# Patient Record
Sex: FEMALE | Race: WHITE | Employment: FULL TIME | URBAN - NONMETROPOLITAN AREA
[De-identification: names, ages, dates, MRNs, and addresses within clinical notes are randomized per-mention and may not be internally consistent; named-entity substitution may affect disease eponyms.]

---

## 2020-12-22 ENCOUNTER — OFFICE VISIT (OUTPATIENT)
Dept: FAMILY MEDICINE CLINIC | Age: 35
End: 2020-12-22
Payer: COMMERCIAL

## 2020-12-22 VITALS
DIASTOLIC BLOOD PRESSURE: 80 MMHG | WEIGHT: 117 LBS | HEIGHT: 61 IN | HEART RATE: 80 BPM | SYSTOLIC BLOOD PRESSURE: 115 MMHG | BODY MASS INDEX: 22.09 KG/M2 | OXYGEN SATURATION: 98 %

## 2020-12-22 PROCEDURE — G8482 FLU IMMUNIZE ORDER/ADMIN: HCPCS | Performed by: NURSE PRACTITIONER

## 2020-12-22 PROCEDURE — 90686 IIV4 VACC NO PRSV 0.5 ML IM: CPT | Performed by: NURSE PRACTITIONER

## 2020-12-22 PROCEDURE — 99385 PREV VISIT NEW AGE 18-39: CPT | Performed by: NURSE PRACTITIONER

## 2020-12-22 PROCEDURE — 90471 IMMUNIZATION ADMIN: CPT | Performed by: NURSE PRACTITIONER

## 2020-12-22 RX ORDER — ALBUTEROL SULFATE 90 UG/1
2 AEROSOL, METERED RESPIRATORY (INHALATION) EVERY 6 HOURS PRN
COMMUNITY
End: 2020-12-22 | Stop reason: SDUPTHER

## 2020-12-22 RX ORDER — DULOXETIN HYDROCHLORIDE 30 MG/1
CAPSULE, DELAYED RELEASE ORAL
COMMUNITY
Start: 2020-12-09 | End: 2021-06-21

## 2020-12-22 RX ORDER — ALBUTEROL SULFATE 90 UG/1
2 AEROSOL, METERED RESPIRATORY (INHALATION) EVERY 6 HOURS PRN
Qty: 1 INHALER | Refills: 1 | Status: SHIPPED | OUTPATIENT
Start: 2020-12-22 | End: 2021-06-21

## 2020-12-22 RX ORDER — ALPRAZOLAM 0.25 MG/1
0.25 TABLET ORAL PRN
COMMUNITY
Start: 2020-12-14

## 2020-12-22 RX ORDER — DULOXETIN HYDROCHLORIDE 60 MG/1
CAPSULE, DELAYED RELEASE ORAL
COMMUNITY
Start: 2020-11-22 | End: 2020-12-22

## 2020-12-22 RX ORDER — TRAZODONE HYDROCHLORIDE 50 MG/1
50 TABLET ORAL NIGHTLY
COMMUNITY
End: 2021-12-23 | Stop reason: ALTCHOICE

## 2020-12-22 SDOH — ECONOMIC STABILITY: TRANSPORTATION INSECURITY
IN THE PAST 12 MONTHS, HAS THE LACK OF TRANSPORTATION KEPT YOU FROM MEDICAL APPOINTMENTS OR FROM GETTING MEDICATIONS?: NO

## 2020-12-22 SDOH — ECONOMIC STABILITY: TRANSPORTATION INSECURITY
IN THE PAST 12 MONTHS, HAS LACK OF TRANSPORTATION KEPT YOU FROM MEETINGS, WORK, OR FROM GETTING THINGS NEEDED FOR DAILY LIVING?: NO

## 2020-12-22 SDOH — ECONOMIC STABILITY: FOOD INSECURITY: WITHIN THE PAST 12 MONTHS, YOU WORRIED THAT YOUR FOOD WOULD RUN OUT BEFORE YOU GOT MONEY TO BUY MORE.: NEVER TRUE

## 2020-12-22 SDOH — HEALTH STABILITY: MENTAL HEALTH: HOW OFTEN DO YOU HAVE A DRINK CONTAINING ALCOHOL?: NOT ASKED

## 2020-12-22 SDOH — ECONOMIC STABILITY: INCOME INSECURITY: HOW HARD IS IT FOR YOU TO PAY FOR THE VERY BASICS LIKE FOOD, HOUSING, MEDICAL CARE, AND HEATING?: NOT HARD AT ALL

## 2020-12-22 SDOH — ECONOMIC STABILITY: FOOD INSECURITY: WITHIN THE PAST 12 MONTHS, THE FOOD YOU BOUGHT JUST DIDN'T LAST AND YOU DIDN'T HAVE MONEY TO GET MORE.: NEVER TRUE

## 2020-12-22 SDOH — HEALTH STABILITY: MENTAL HEALTH: HOW MANY STANDARD DRINKS CONTAINING ALCOHOL DO YOU HAVE ON A TYPICAL DAY?: NOT ASKED

## 2020-12-22 ASSESSMENT — ENCOUNTER SYMPTOMS
VOMITING: 0
SINUS PRESSURE: 0
CONSTIPATION: 0
NAUSEA: 0
WHEEZING: 0
COUGH: 0
CHEST TIGHTNESS: 0
SHORTNESS OF BREATH: 0
SORE THROAT: 0
SINUS PAIN: 0
DIARRHEA: 0
RHINORRHEA: 0
ABDOMINAL PAIN: 0

## 2020-12-22 ASSESSMENT — PATIENT HEALTH QUESTIONNAIRE - PHQ9
SUM OF ALL RESPONSES TO PHQ QUESTIONS 1-9: 0
SUM OF ALL RESPONSES TO PHQ9 QUESTIONS 1 & 2: 0
SUM OF ALL RESPONSES TO PHQ QUESTIONS 1-9: 0
1. LITTLE INTEREST OR PLEASURE IN DOING THINGS: 0
2. FEELING DOWN, DEPRESSED OR HOPELESS: 0
SUM OF ALL RESPONSES TO PHQ QUESTIONS 1-9: 0

## 2020-12-22 NOTE — PROGRESS NOTES
of hurting self or others. For stress relief, she listens to music and completes puzzles. Past Medical History:     Past Medical History:   Diagnosis Date    Arthritis     Asthma     Eczema       Reviewed all health maintenance requirements and ordered appropriate tests  Health Maintenance Due   Topic Date Due    HIV screen  12/28/1999    Cervical cancer screen  12/28/2005       Past Surgical History:     No past surgical history on file. Medications:       Prior to Admission medications    Medication Sig Start Date End Date Taking? Authorizing Provider   ALPRAZolam [employer]) 0.25 MG tablet  12/14/20  Yes Historical Provider, MD   DULoxetine (CYMBALTA) 30 MG extended release capsule TAKE 1 CAPSULE BY MOUTH ONCE DAILY (DO NOT CRUSH OR CHEW) 12/9/20  Yes Historical Provider, MD   sertraline (ZOLOFT) 50 MG tablet TAKE 1 TABLET BY MOUTH ONCE DAILY 12/9/20  Yes Historical Provider, MD   traZODone (DESYREL) 50 MG tablet Take 50 mg by mouth nightly   Yes Historical Provider, MD   albuterol sulfate HFA (VENTOLIN HFA) 108 (90 Base) MCG/ACT inhaler Inhale 2 puffs into the lungs every 6 hours as needed for Wheezing or Shortness of Breath 12/22/20  Yes MARGARETTE Thurman CNP        Allergies:       Patient has no known allergies. Social History:     Tobacco:    reports that she has never smoked. She has never used smokeless tobacco.  Alcohol:      reports current alcohol use. Drug Use:  reports no history of drug use. Family History:     No family history on file. Review of Systems:     Positive and Negative as described in HPI    Review of Systems   Constitutional: Positive for fatigue. Negative for chills, fever and unexpected weight change. HENT: Negative for congestion, rhinorrhea, sinus pressure, sinus pain and sore throat. Eyes: Negative for visual disturbance. Respiratory: Negative for cough, chest tightness, shortness of breath and wheezing.     Cardiovascular: Negative for chest pain There is no guarding. Hernia: No hernia is present. Lymphadenopathy:      Cervical: No cervical adenopathy. Skin:     General: Skin is warm. Comments: Scaling patches present right elbow and right shoulder   Neurological:      Mental Status: She is alert and oriented to person, place, and time. Psychiatric:         Mood and Affect: Mood normal.         Behavior: Behavior normal.         Thought Content: Thought content normal.         Judgment: Judgment normal.         Data:     No results found for: NA, K, CL, CO2, BUN, CREATININE, GLUCOSE, PROT, LABALBU, BILITOT, ALKPHOS, AST, ALT  Lab Results   Component Value Date    WBC 8.6 05/07/2012    RBC 3.71 05/07/2012    HGB 12.6 05/07/2012    HCT 35.8 05/07/2012    MCV 96.5 05/07/2012    MCH 33.9 05/07/2012    MCHC 35.2 05/07/2012    RDW 14.4 05/07/2012     05/07/2012    MPV NOT REPORTED 05/07/2012     No results found for: TSH  No results found for: CHOL, HDL, PSA, LABA1C    Assessment/Plan:      Diagnosis Orders   1. Wellness examination  ALT    AST    Basic Metabolic Panel    CBC    Hemoglobin A1C    Lipid Panel    TSH without Reflex   2. Needs flu shot  INFLUENZA, QUADV, 3 YRS AND OLDER, IM PF, PREFILL SYR OR SDV, 0.5ML (AFLURIA QUADV, PF)   3. Mild intermittent asthma without complication     4. Arthritis     5. Anxiety     6. Other depression     7. Eczema, unspecified type         -Reports last Pap greater than 1 year ago. Planning to follow-up with Dr. Mg Beverly (GYN). -Received flu vaccination in office today. -Supplied health counseling on well-balanced diet and routine physical exercise. -Recommended CeraVe emollient, lukewarm baths, and hydrocortisone cream for eczema.  -Will refill albuterol  -Recommended Voltaren gel for left ankle pain.  -Baseline labs ordered today.   --Call if symptoms worsen or persist. F/u 1 year for wellness    Completed Refills   Requested Prescriptions     Signed Prescriptions Disp Refills    albuterol sulfate HFA (VENTOLIN HFA) 108 (90 Base) MCG/ACT inhaler 1 Inhaler 1     Sig: Inhale 2 puffs into the lungs every 6 hours as needed for Wheezing or Shortness of Breath       Orders Placed This Encounter   Procedures    INFLUENZA, QUADV, 3 YRS AND OLDER, IM PF, PREFILL SYR OR SDV, 0.5ML (AFLURIA QUADV, PF)    ALT     Standing Status:   Future     Standing Expiration Date:   12/22/2021    AST     Standing Status:   Future     Standing Expiration Date:   12/22/2021    Basic Metabolic Panel     Standing Status:   Future     Standing Expiration Date:   12/22/2021    CBC     Standing Status:   Future     Standing Expiration Date:   12/22/2021    Hemoglobin A1C     Standing Status:   Future     Standing Expiration Date:   12/22/2021    Lipid Panel     Standing Status:   Future     Standing Expiration Date:   12/22/2021     Order Specific Question:   Is Patient Fasting?/# of Hours     Answer:   fasting, 8    TSH without Reflex     Standing Status:   Future     Standing Expiration Date:   12/22/2021        No results found for this visit on 12/22/20. Return in about 1 year (around 12/22/2021), or if symptoms worsen or fail to improve, for wellness. labs now.  .    Electronically signed by MARGARETTE Beatty CNP on 12/22/20 at 11:43 AM.

## 2020-12-30 ENCOUNTER — HOSPITAL ENCOUNTER (OUTPATIENT)
Age: 35
Discharge: HOME | End: 2020-12-30
Payer: COMMERCIAL

## 2020-12-30 LAB
ALT SERPL-CCNC: 26 U/L (ref 5–33)
ANION GAP SERPL CALCULATED.3IONS-SCNC: 9 MMOL/L (ref 9–17)
AST SERPL-CCNC: 21 U/L
BUN BLDV-MCNC: 14 MG/DL (ref 6–20)
BUN/CREAT BLD: 30 (ref 9–20)
CALCIUM SERPL-MCNC: 9.3 MG/DL (ref 8.6–10.4)
CHLORIDE BLD-SCNC: 101 MMOL/L (ref 98–107)
CHOLESTEROL/HDL RATIO: 2.5
CHOLESTEROL: 182 MG/DL
CO2: 26 MMOL/L (ref 20–31)
CREAT SERPL-MCNC: 0.46 MG/DL (ref 0.5–0.9)
ESTIMATED AVERAGE GLUCOSE: 80 MG/DL
GFR AFRICAN AMERICAN: >60 ML/MIN
GFR NON-AFRICAN AMERICAN: >60 ML/MIN
GFR SERPL CREATININE-BSD FRML MDRD: ABNORMAL ML/MIN/{1.73_M2}
GFR SERPL CREATININE-BSD FRML MDRD: ABNORMAL ML/MIN/{1.73_M2}
GLUCOSE BLD-MCNC: 91 MG/DL (ref 70–99)
HBA1C MFR BLD: 4.4 % (ref 4–6)
HCT VFR BLD CALC: 43.7 % (ref 36.3–47.1)
HDLC SERPL-MCNC: 73 MG/DL
HEMOGLOBIN: 14.3 G/DL (ref 11.9–15.1)
LDL CHOLESTEROL: 73 MG/DL (ref 0–130)
MCH RBC QN AUTO: 32.3 PG (ref 25.2–33.5)
MCHC RBC AUTO-ENTMCNC: 32.7 G/DL (ref 28.4–34.8)
MCV RBC AUTO: 98.6 FL (ref 82.6–102.9)
NRBC AUTOMATED: 0 PER 100 WBC
PDW BLD-RTO: 12.3 % (ref 11.8–14.4)
PLATELET # BLD: 286 K/UL (ref 138–453)
PMV BLD AUTO: 9.4 FL (ref 8.1–13.5)
POTASSIUM SERPL-SCNC: 3.7 MMOL/L (ref 3.7–5.3)
RBC # BLD: 4.43 M/UL (ref 3.95–5.11)
SODIUM BLD-SCNC: 136 MMOL/L (ref 135–144)
TRIGL SERPL-MCNC: 178 MG/DL
TSH SERPL DL<=0.05 MIU/L-ACNC: 0.57 MIU/L (ref 0.3–5)
VLDLC SERPL CALC-MCNC: ABNORMAL MG/DL (ref 1–30)
WBC # BLD: 5.8 K/UL (ref 3.5–11.3)

## 2020-12-30 PROCEDURE — 84460 ALANINE AMINO (ALT) (SGPT): CPT

## 2020-12-30 PROCEDURE — 84450 TRANSFERASE (AST) (SGOT): CPT

## 2020-12-30 PROCEDURE — 80048 BASIC METABOLIC PNL TOTAL CA: CPT

## 2020-12-30 PROCEDURE — 36415 COLL VENOUS BLD VENIPUNCTURE: CPT

## 2020-12-30 PROCEDURE — 84443 ASSAY THYROID STIM HORMONE: CPT

## 2020-12-30 PROCEDURE — 85027 COMPLETE CBC AUTOMATED: CPT

## 2020-12-30 PROCEDURE — 83036 HEMOGLOBIN GLYCOSYLATED A1C: CPT

## 2020-12-30 PROCEDURE — 80061 LIPID PANEL: CPT

## 2021-06-21 ENCOUNTER — HOSPITAL ENCOUNTER (OUTPATIENT)
Age: 36
Setting detail: SPECIMEN
Discharge: HOME | End: 2021-06-21
Payer: COMMERCIAL

## 2021-06-21 ENCOUNTER — OFFICE VISIT (OUTPATIENT)
Dept: OBGYN | Age: 36
End: 2021-06-21
Payer: COMMERCIAL

## 2021-06-21 VITALS
DIASTOLIC BLOOD PRESSURE: 68 MMHG | BODY MASS INDEX: 23.22 KG/M2 | WEIGHT: 123 LBS | SYSTOLIC BLOOD PRESSURE: 112 MMHG | HEIGHT: 61 IN

## 2021-06-21 DIAGNOSIS — Z01.419 WOMEN'S ANNUAL ROUTINE GYNECOLOGICAL EXAMINATION: Primary | ICD-10-CM

## 2021-06-21 DIAGNOSIS — R10.2 PELVIC PAIN: ICD-10-CM

## 2021-06-21 DIAGNOSIS — Z01.419 WOMEN'S ANNUAL ROUTINE GYNECOLOGICAL EXAMINATION: ICD-10-CM

## 2021-06-21 PROCEDURE — 99385 PREV VISIT NEW AGE 18-39: CPT | Performed by: OBSTETRICS & GYNECOLOGY

## 2021-06-21 PROCEDURE — G0145 SCR C/V CYTO,THINLAYER,RESCR: HCPCS

## 2021-06-21 RX ORDER — QUETIAPINE FUMARATE 50 MG/1
TABLET, FILM COATED ORAL NIGHTLY PRN
COMMUNITY
Start: 2021-04-22

## 2021-06-21 RX ORDER — SERTRALINE HYDROCHLORIDE 100 MG/1
TABLET, FILM COATED ORAL
COMMUNITY
Start: 2021-06-03

## 2021-06-21 ASSESSMENT — ENCOUNTER SYMPTOMS
CONSTIPATION: 0
SHORTNESS OF BREATH: 0
DIARRHEA: 0
ABDOMINAL PAIN: 0

## 2021-06-21 ASSESSMENT — PATIENT HEALTH QUESTIONNAIRE - PHQ9
SUM OF ALL RESPONSES TO PHQ9 QUESTIONS 1 & 2: 0
1. LITTLE INTEREST OR PLEASURE IN DOING THINGS: 0
SUM OF ALL RESPONSES TO PHQ QUESTIONS 1-9: 0
2. FEELING DOWN, DEPRESSED OR HOPELESS: 0

## 2021-06-21 NOTE — PROGRESS NOTES
SUBJECTIVE:      Patient ID: Kamilah Xiao is a 62 y.o. female.    Chief Complaint: Osteoarthritis (Requesting  referral Amadou )    Kamilah Xiao, a 62 y.o female, presents to the clinic as a new patient for a osteoarthritis referral.  She has an appointment with Dr. Tobar on 12/ at 1:15 and was told she needed a referral before her appointment.  Osteoarthirtis started in 2014 and became wose in 2016.  Sites include knees, hands, lower back, and hips.  The pain is worse in the lower back and hips.  She cannot stand for long periods of time or walk long distances.  She has to take multiple breaks throughout the day when doing house work.  Taking meloxicam 15 mg daily.  She currently denies pain.     Osteoarthritis  This is a chronic problem. The current episode started more than 1 year ago. The problem occurs 2 to 4 times per day. The problem has been waxing and waning. Associated symptoms include arthralgias and joint swelling (hands). Pertinent negatives include no abdominal pain, change in bowel habit, chest pain, chills, congestion, coughing, diaphoresis, fatigue, fever, headaches, nausea, neck pain, numbness, rash, sore throat, vomiting or weakness. The symptoms are aggravated by walking, standing and exertion. She has tried NSAIDs and heat for the symptoms. The treatment provided moderate relief.       No family history on file.   Social History     Socioeconomic History    Marital status:      Spouse name: Not on file    Number of children: Not on file    Years of education: Not on file    Highest education level: Not on file   Occupational History    Not on file   Social Needs    Financial resource strain: Not on file    Food insecurity     Worry: Not on file     Inability: Not on file    Transportation needs     Medical: Not on file     Non-medical: Not on file   Tobacco Use    Smoking status: Never Smoker    Smokeless tobacco: Never Used   Substance and Sexual Activity    Alcohol  YEARLY PHYSICAL    Date of service: 2021    Irvin Robertson  Is a 38 y.o.   female    PT's PCP is: MARGARETTE Roberts CNP     : 1985                                             Subjective:       Patient's last menstrual period was 06/10/2021 (exact date). Are your menses regular: not applicable    OB History    Para Term  AB Living   3 2 2   1 2   SAB TAB Ectopic Molar Multiple Live Births   1         2      # Outcome Date GA Lbr Mason/2nd Weight Sex Delivery Anes PTL Lv   3 Term 12    F Vag-Spont   ANTOLIN   2 Term 06/01/10    M Vag-Spont   ANTOLIN   1 SAB 2009     SAB   ND        Social History     Tobacco Use   Smoking Status Never Smoker   Smokeless Tobacco Never Used        Social History     Substance and Sexual Activity   Alcohol Use Yes       Family History   Problem Relation Age of Onset    Thyroid Disease Mother        Any family history of breast or ovarian cancer: Yes (great aunt)    Any family history of blood clots: Yes        Allergies: Patient has no known allergies. Current Outpatient Medications:     QUEtiapine (SEROQUEL) 50 MG tablet, TAKE 1 TO 2 TABLETS BY MOUTH AT BEDTIME, Disp: , Rfl:     sertraline (ZOLOFT) 100 MG tablet, TAKE 1 TABLET BY MOUTH ONCE DAILY, Disp: , Rfl:     ALPRAZolam (XANAX) 0.25 MG tablet, , Disp: , Rfl:     traZODone (DESYREL) 50 MG tablet, Take 50 mg by mouth nightly, Disp: , Rfl:     Social History     Substance and Sexual Activity   Sexual Activity Yes    Partners: Male    Birth control/protection: Surgical       Any bleeding or pain with intercourse: No    Last Yearly:  2018    Last pap: 2018?     Last HPV: unknown    Last Mammogram: never    Last Shearon Martin never    Last colorectal screen- type:colonoscopy*  date      Do you do self breast exams: No    Past Medical History:   Diagnosis Date    Arthritis     Asthma     Eczema        Past Surgical History:   Procedure Laterality Date    CHOLECYSTECTOMY      ENDOMETRIAL ABLATION      KNEE SURGERY Right     LEG SURGERY Left     15 surgical procedures.  TONSILLECTOMY         Family History   Problem Relation Age of Onset    Thyroid Disease Mother        Chief Complaint   Patient presents with    Gynecologic Exam     Patient is being seen today for yearly and pap. She notes that last few cycles she has had horrible cramping. PE: Vital Signs  Blood pressure 112/68, height 5' 1\" (1.549 m), weight 123 lb (55.8 kg), last menstrual period 06/10/2021, not currently breastfeeding. Estimated body mass index is 23.24 kg/m² as calculated from the following:    Height as of this encounter: 5' 1\" (1.549 m). Weight as of this encounter: 123 lb (55.8 kg). Labs:    No results found for this visit on 06/21/21. PHQ-9 Total Score: 0 (6/21/2021  6:33 PM)      NURSE: Farida WEEMS      HPI: here for her annual exam    Review of Systems   Constitutional: Negative for chills, fatigue and fever. Respiratory: Negative for shortness of breath. Cardiovascular: Negative for chest pain. Gastrointestinal: Negative for abdominal pain, constipation and diarrhea. Genitourinary: Positive for pelvic pain (has started having pelvic pain again - hadn't had anything for years after ablation). Negative for dysuria, enuresis, frequency, menstrual problem, urgency and vaginal bleeding. Neurological: Negative for dizziness, light-headedness and headaches. Objective  Physical Exam  Constitutional:       Appearance: Normal appearance. Genitourinary:      Pelvic exam was performed with patient in the lithotomy position. Vulva, vagina, cervix, uterus, right adnexa and left adnexa normal.   HENT:      Head: Atraumatic. Mouth/Throat:      Mouth: Mucous membranes are moist.   Eyes:      Extraocular Movements: Extraocular movements intact. Pupils: Pupils are equal, round, and reactive to light. use: Never     Frequency: Never    Drug use: Not on file    Sexual activity: Not on file   Lifestyle    Physical activity     Days per week: Not on file     Minutes per session: Not on file    Stress: Not on file   Relationships    Social connections     Talks on phone: Not on file     Gets together: Not on file     Attends Religion service: Not on file     Active member of club or organization: Not on file     Attends meetings of clubs or organizations: Not on file     Relationship status: Not on file   Other Topics Concern    Not on file   Social History Narrative    Not on file     Current Outpatient Medications   Medication Sig Dispense Refill    calcium carbonate-vitamin D3 (CALCIUM 500 + D, D3,) 500 mg(1,250mg) -125 unit per tablet Take 1 tablet by mouth once daily.      meloxicam (MOBIC) 15 MG tablet Take 15 mg by mouth once daily.      omeprazole (PRILOSEC OTC) 20 MG tablet Take 20 mg by mouth once daily.      spironolactone (ALDACTONE) 100 MG tablet Take 100 mg by mouth once daily.       No current facility-administered medications for this visit.      Review of patient's allergies indicates:  No Known Allergies   No past medical history on file.  Past Surgical History:   Procedure Laterality Date    CHOLECYSTECTOMY  1999       Review of Systems   Constitutional: Negative for activity change, appetite change, chills, diaphoresis, fatigue, fever and unexpected weight change.   HENT: Negative for congestion, ear pain, sinus pressure, sore throat, trouble swallowing and voice change.    Eyes: Negative for pain, discharge and visual disturbance.   Respiratory: Negative for cough, chest tightness, shortness of breath and wheezing.    Cardiovascular: Negative for chest pain and palpitations.   Gastrointestinal: Negative for abdominal pain, change in bowel habit, constipation, diarrhea, nausea and vomiting.   Genitourinary: Negative for difficulty urinating, flank pain, frequency and urgency.  "  Musculoskeletal: Positive for arthralgias, back pain and joint swelling (hands). Negative for neck pain.        Knee, marcel hip, and marcel hand pain.   Skin: Negative for color change and rash.   Neurological: Negative for dizziness, seizures, syncope, weakness, numbness and headaches.   Hematological: Negative for adenopathy.   Psychiatric/Behavioral: Negative for dysphoric mood and sleep disturbance. The patient is not nervous/anxious.       OBJECTIVE:      Vitals:    12/07/20 1456   BP: 124/82   BP Location: Left arm   Patient Position: Sitting   BP Method: Large (Automatic)   Pulse: 78   Temp: 98.5 °F (36.9 °C)   SpO2: 97%   Weight: 107.5 kg (237 lb)   Height: 5' 5.5" (1.664 m)     Physical Exam  Vitals signs and nursing note reviewed.   Constitutional:       General: She is awake. She is not in acute distress.     Appearance: Normal appearance. She is obese. She is not ill-appearing, toxic-appearing or diaphoretic.   HENT:      Head: Normocephalic and atraumatic.      Right Ear: Tympanic membrane, ear canal and external ear normal. There is no impacted cerumen.      Left Ear: Tympanic membrane, ear canal and external ear normal. There is no impacted cerumen.      Mouth/Throat:      Mouth: Mucous membranes are moist.      Pharynx: No oropharyngeal exudate or posterior oropharyngeal erythema.   Eyes:      General: Lids are normal. No scleral icterus.     Conjunctiva/sclera: Conjunctivae normal.      Pupils: Pupils are equal, round, and reactive to light.   Neck:      Musculoskeletal: Neck supple.      Thyroid: No thyromegaly.   Cardiovascular:      Rate and Rhythm: Normal rate and regular rhythm.      Pulses: Normal pulses.      Heart sounds: Normal heart sounds. No murmur. No friction rub. No gallop.    Pulmonary:      Effort: Pulmonary effort is normal. No respiratory distress.      Breath sounds: Normal breath sounds. No stridor. No wheezing, rhonchi or rales.   Musculoskeletal: Normal range of motion.      " Cardiovascular:      Rate and Rhythm: Normal rate. Pulmonary:      Effort: Pulmonary effort is normal.   Chest:      Breasts:         Right: Normal.         Left: Normal.   Abdominal:      General: There is no distension. Palpations: Abdomen is soft. Tenderness: There is no abdominal tenderness. Musculoskeletal:         General: Normal range of motion. Cervical back: Normal range of motion. Neurological:      Mental Status: She is alert and oriented to person, place, and time. Skin:     General: Skin is warm and dry. Psychiatric:         Mood and Affect: Mood normal.         Behavior: Behavior normal.         Thought Content: Thought content normal.         Judgment: Judgment normal.   Chaperone present: chaperone declined. Assessment and Plan          Diagnosis Orders   1. Women's annual routine gynecological examination  PAP SMEAR   2. Pelvic pain               I am having Brittnee Mocha. Shirin maintain her ALPRAZolam, traZODone, QUEtiapine, and sertraline. Return in about 2 weeks (around 7/5/2021) for usn, follow up. She was also counseled on her preventative health maintenance recommendations and follow-up. There are no Patient Instructions on file for this visit.     Kiana Sanchez DO,6/21/2021 6:54 PM Lumbar back: She exhibits tenderness.      Right lower le+ Edema present.      Left lower le+ Edema present.   Lymphadenopathy:      Cervical: No cervical adenopathy.   Skin:     General: Skin is warm and dry.      Capillary Refill: Capillary refill takes less than 2 seconds.      Findings: No erythema or rash.   Neurological:      Mental Status: She is alert and oriented to person, place, and time. Mental status is at baseline.   Psychiatric:         Mood and Affect: Mood normal.         Behavior: Behavior normal. Behavior is cooperative.         Thought Content: Thought content normal.         Judgment: Judgment normal.        Assessment:       1. Osteoarthritis of multiple joints, unspecified osteoarthritis type        Plan:       Osteoarthritis of multiple joints, unspecified osteoarthritis type   Stable, continue Meloxicam.  Referral provided at patient request for upcoming appointment.   -     Ambulatory referral/consult to Rheumatology; Future; Expected date: 2020    This note was created using [employer] voice recognition software that occasionally misinterprets phrases or words.    Follow up if symptoms worsen or fail to improve.      2020 PAPITO Harris, FNP

## 2021-06-28 LAB — CYTOLOGY REPORT: NORMAL

## 2021-06-30 DIAGNOSIS — N76.0 BV (BACTERIAL VAGINOSIS): Primary | ICD-10-CM

## 2021-06-30 DIAGNOSIS — B96.89 BV (BACTERIAL VAGINOSIS): Primary | ICD-10-CM

## 2021-06-30 RX ORDER — METRONIDAZOLE 500 MG/1
500 TABLET ORAL 2 TIMES DAILY
Qty: 14 TABLET | Refills: 0 | Status: SHIPPED | OUTPATIENT
Start: 2021-06-30 | End: 2021-07-07

## 2021-07-20 ENCOUNTER — OFFICE VISIT (OUTPATIENT)
Dept: OBGYN | Age: 36
End: 2021-07-20
Payer: COMMERCIAL

## 2021-07-20 VITALS
HEIGHT: 61 IN | WEIGHT: 122 LBS | BODY MASS INDEX: 23.03 KG/M2 | DIASTOLIC BLOOD PRESSURE: 68 MMHG | SYSTOLIC BLOOD PRESSURE: 106 MMHG

## 2021-07-20 DIAGNOSIS — N80.03 ADENOMYOSIS: ICD-10-CM

## 2021-07-20 DIAGNOSIS — R10.2 PELVIC PAIN IN FEMALE: Primary | ICD-10-CM

## 2021-07-20 PROCEDURE — G8427 DOCREV CUR MEDS BY ELIG CLIN: HCPCS | Performed by: OBSTETRICS & GYNECOLOGY

## 2021-07-20 PROCEDURE — G8420 CALC BMI NORM PARAMETERS: HCPCS | Performed by: OBSTETRICS & GYNECOLOGY

## 2021-07-20 PROCEDURE — 99213 OFFICE O/P EST LOW 20 MIN: CPT | Performed by: OBSTETRICS & GYNECOLOGY

## 2021-07-20 PROCEDURE — 1036F TOBACCO NON-USER: CPT | Performed by: OBSTETRICS & GYNECOLOGY

## 2021-07-20 RX ORDER — NAPROXEN 500 MG/1
500 TABLET ORAL 2 TIMES DAILY PRN
Qty: 30 TABLET | Refills: 5 | Status: SHIPPED | OUTPATIENT
Start: 2021-07-20 | End: 2022-07-26

## 2021-07-20 RX ORDER — TOPIRAMATE 25 MG/1
TABLET ORAL
COMMUNITY
Start: 2021-06-28

## 2021-07-20 NOTE — PROGRESS NOTES
DATE OF VISIT:  7/20/21    PATIENT NAME:  Avelino Carpio     YOB: 1985    REASON FOR VISIT:    Chief Complaint   Patient presents with    Follow-up     Patient is being for follow up after GYN US. She had this done due to severe crmaping during periods. HISTORY OF PRESENT ILLNESS:  Pt continues to have pelvic pain/cramping the day before and the 3 days of her cycle; cycles arent heavy since her ablation but now painful; disc'd nsaid, POP, v considering tlh if impact on quality of life warrants; pt wants to just start with nsaid at this point      Patient's last menstrual period was 07/09/2021. Vitals:    07/20/21 1449   BP: 106/68   Weight: 122 lb (55.3 kg)   Height: 5' 1\" (1.549 m)     Body mass index is 23.05 kg/m². No Known Allergies  Current Outpatient Medications   Medication Sig Dispense Refill    topiramate (TOPAMAX) 25 MG tablet TAKE 1 TABLET BY MOUTH TWICE DAILY      naproxen (NAPROSYN) 500 MG tablet Take 1 tablet by mouth 2 times daily as needed for Pain 30 tablet 5    QUEtiapine (SEROQUEL) 50 MG tablet TAKE 1 TO 2 TABLETS BY MOUTH AT BEDTIME      sertraline (ZOLOFT) 100 MG tablet TAKE 1 TABLET BY MOUTH ONCE DAILY      ALPRAZolam (XANAX) 0.25 MG tablet       traZODone (DESYREL) 50 MG tablet Take 50 mg by mouth nightly       No current facility-administered medications for this visit. Social History     Socioeconomic History    Marital status:      Spouse name: None    Number of children: None    Years of education: None    Highest education level: None   Occupational History    None   Tobacco Use    Smoking status: Never Smoker    Smokeless tobacco: Never Used   Substance and Sexual Activity    Alcohol use:  Yes    Drug use: Never    Sexual activity: Yes     Partners: Male     Birth control/protection: Surgical   Other Topics Concern    None   Social History Narrative    None     Social Determinants of Health     Financial Resource Strain: Low Risk  Difficulty of Paying Living Expenses: Not hard at all   Food Insecurity: No Food Insecurity    Worried About Running Out of Food in the Last Year: Never true    Ran Out of Food in the Last Year: Never true   Transportation Needs: No Transportation Needs    Lack of Transportation (Medical): No    Lack of Transportation (Non-Medical): No   Physical Activity:     Days of Exercise per Week:     Minutes of Exercise per Session:    Stress:     Feeling of Stress :    Social Connections:     Frequency of Communication with Friends and Family:     Frequency of Social Gatherings with Friends and Family:     Attends Jehovah's witness Services:     Active Member of Clubs or Organizations:     Attends Club or Organization Meetings:     Marital Status:    Intimate Partner Violence:     Fear of Current or Ex-Partner:     Emotionally Abused:     Physically Abused:     Sexually Abused:        REVIEW OF SYSTEMS:  Review of Systems   Constitutional: Negative for chills and fever. Genitourinary: Positive for pelvic pain. Negative for dysuria, menstrual problem and vaginal discharge. PHYSICAL EXAM:  /68   Ht 5' 1\" (1.549 m)   Wt 122 lb (55.3 kg)   LMP 07/09/2021   BMI 23.05 kg/m²   Physical Exam  Constitutional:       Appearance: Normal appearance. HENT:      Head: Normocephalic and atraumatic. Eyes:      Extraocular Movements: Extraocular movements intact. Pupils: Pupils are equal, round, and reactive to light. Cardiovascular:      Rate and Rhythm: Normal rate. Pulmonary:      Effort: Pulmonary effort is normal.   Musculoskeletal:         General: Normal range of motion. Cervical back: Normal range of motion. Neurological:      Mental Status: She is alert and oriented to person, place, and time. Skin:     General: Skin is warm and dry. Psychiatric:         Mood and Affect: Mood normal.         Behavior: Behavior normal.         Thought Content:  Thought content normal. Judgment: Judgment normal.       The patient, Blanka Espinal is a 38 y.o. female, was seen with a total time spent of 20 minutes for the visit on this date of service by the E/M provider. The time component had both face to face and non face to face time spent in determining the total time component. Counseling and education regarding her diagnosis listed below and her options regarding those diagnoses were also included in determining her time component. Diagnosis Orders   1. Pelvic pain in female     2. Adenomyosis          The patient had her preventative health maintenance recommendations and follow-up reviewed with her at the completion of her visit. ASSESSMENT:      1. Pelvic pain in female    2. Adenomyosis        PLAN:  No orders of the defined types were placed in this encounter.     Return for annual.       Electronically signed by Reanna Junior DO on 07/20/21

## 2021-08-03 ENCOUNTER — OFFICE VISIT (OUTPATIENT)
Dept: FAMILY MEDICINE CLINIC | Age: 36
End: 2021-08-03
Payer: COMMERCIAL

## 2021-08-03 VITALS
SYSTOLIC BLOOD PRESSURE: 110 MMHG | HEIGHT: 61 IN | WEIGHT: 122 LBS | BODY MASS INDEX: 23.03 KG/M2 | DIASTOLIC BLOOD PRESSURE: 62 MMHG

## 2021-08-03 DIAGNOSIS — L24.7 IRRITANT CONTACT DERMATITIS DUE TO PLANTS, EXCEPT FOOD: Primary | ICD-10-CM

## 2021-08-03 PROCEDURE — 1036F TOBACCO NON-USER: CPT | Performed by: NURSE PRACTITIONER

## 2021-08-03 PROCEDURE — G8427 DOCREV CUR MEDS BY ELIG CLIN: HCPCS | Performed by: NURSE PRACTITIONER

## 2021-08-03 PROCEDURE — 99213 OFFICE O/P EST LOW 20 MIN: CPT | Performed by: NURSE PRACTITIONER

## 2021-08-03 PROCEDURE — G8420 CALC BMI NORM PARAMETERS: HCPCS | Performed by: NURSE PRACTITIONER

## 2021-08-03 RX ORDER — PREDNISONE 20 MG/1
TABLET ORAL
Qty: 11 TABLET | Refills: 0 | Status: SHIPPED | OUTPATIENT
Start: 2021-08-03 | End: 2021-08-10 | Stop reason: ALTCHOICE

## 2021-08-03 RX ORDER — TRIAMCINOLONE ACETONIDE 1 MG/G
CREAM TOPICAL
Qty: 1 TUBE | Refills: 1 | Status: SHIPPED | OUTPATIENT
Start: 2021-08-03 | End: 2021-08-10 | Stop reason: SDUPTHER

## 2021-08-03 NOTE — PROGRESS NOTES
Name: Taisha Llamas  : 1985         Chief Complaint:     Chief Complaint   Patient presents with    Rash     Pt presents today for a 5-6 day hx of bug bites/rash. States this continues to spread and has areas on her R antecubital space, R calf and heel and L second toe. Now she has a number of smaller areas going up in her legs and into her thighs. She has tried hydrocortisone, Benadryl, Bactine, oatmeal bath, calamine and baby shampoo. Nothing has offered any relief. History of Present Illness: Taisha Llamas is a 38 y.o.  female who presents with Rash (Pt presents today for a 5-6 day hx of bug bites/rash. States this continues to spread and has areas on her R antecubital space, R calf and heel and L second toe. Now she has a number of smaller areas going up in her legs and into her thighs. She has tried hydrocortisone, Benadryl, Bactine, oatmeal bath, calamine and baby shampoo. Nothing has offered any relief. )      HPI  The patient presents with rash. Rash began 5 days ago and started right arm and right leg and has since spread to left toe, right calf, right chest, and right arm. Rash is continuing to spread. She has used hydrocortisone, oatmeal baths, baby shampoo, and calamine lotion without relief. She went hiking 6 days ago and was around a lot of plants at this time. She has tried to keep it clean and has cleaned her sheets. Rash is pruritic. Rash is not painful. Denies fever or chills. Denies new lotions, soaps, detergents, or foods. Denies house contacts with similar symptoms.      Past Medical History:     Past Medical History:   Diagnosis Date    Arthritis     Asthma     Eczema       Reviewed all health maintenance requirements and ordered appropriate tests  Health Maintenance Due   Topic Date Due    Hepatitis C screen  Never done    HIV screen  Never done       Past Surgical History:     Past Surgical History:   Procedure Laterality Date    CHOLECYSTECTOMY      ENDOMETRIAL ABLATION      KNEE SURGERY Right     LEG SURGERY Left     15 surgical procedures.  TONSILLECTOMY          Medications:       Prior to Admission medications    Medication Sig Start Date End Date Taking? Authorizing Provider   predniSONE (DELTASONE) 20 MG tablet Take 2 tablets by mouth once daily x3 days. Then take 1 tablet by mouth once daily x3 days. Then take 1/2 tablet by mouth once daily x3 days. 8/3/21  Yes MARGARETTE Vivar CNP   triamcinolone (KENALOG) 0.1 % cream Apply topically 2 times daily. Do not apply to face or genital area. 8/3/21  Yes MARGARETTE Vivar CNP   topiramate (TOPAMAX) 25 MG tablet TAKE 1 TABLET BY MOUTH TWICE DAILY 6/28/21  Yes Historical Provider, MD   naproxen (NAPROSYN) 500 MG tablet Take 1 tablet by mouth 2 times daily as needed for Pain 7/20/21  Yes Von Most, DO   QUEtiapine (SEROQUEL) 50 MG tablet TAKE 1 TO 2 TABLETS BY MOUTH AT BEDTIME 4/22/21  Yes Historical Provider, MD   sertraline (ZOLOFT) 100 MG tablet TAKE 1 TABLET BY MOUTH ONCE DAILY 6/3/21  Yes Historical Provider, MD   ALPRAZolam Joycelyn Pile) 0.25 MG tablet  12/14/20  Yes Historical Provider, MD   traZODone (DESYREL) 50 MG tablet Take 50 mg by mouth nightly   Yes Historical Provider, MD        Allergies:       Patient has no known allergies. Social History:     Tobacco:    reports that she has never smoked. She has never used smokeless tobacco.  Alcohol:      reports current alcohol use. Drug Use:  reports no history of drug use. Family History:     Family History   Problem Relation Age of Onset    Thyroid Disease Mother        Review of Systems:     Positive and Negative as described in HPI    Review of Systems   Constitutional: Negative for chills and fever. Skin: Positive for rash.        Physical Exam:   Vitals:  /62 (Site: Left Upper Arm, Position: Sitting, Cuff Size: Medium Adult)   Ht 5' 1\" (1.549 m)   Wt 122 lb (55.3 kg)   LMP 07/09/2021   BMI 23.05 kg/m²     Physical Exam  Constitutional:       General: She is not in acute distress. Appearance: Normal appearance. She is normal weight. She is not ill-appearing or toxic-appearing. HENT:      Head: Normocephalic. Cardiovascular:      Rate and Rhythm: Normal rate and regular rhythm. Heart sounds: Normal heart sounds. No murmur heard. Pulmonary:      Effort: Pulmonary effort is normal. No respiratory distress. Breath sounds: Normal breath sounds. No stridor. No wheezing, rhonchi or rales. Skin:     General: Skin is warm. Comments: Erythematous, slightly raised patch present on AC area right upper extremity and medial right leg. Erythematous papules with mild orange crusting present in linear fashion on upper and lower extremities bilaterally, right chest, right heel, and left second toe. Neurological:      Mental Status: She is alert and oriented to person, place, and time. Psychiatric:         Mood and Affect: Mood normal.         Behavior: Behavior normal.         Thought Content: Thought content normal.         Judgment: Judgment normal.         Data:     Lab Results   Component Value Date     12/30/2020    K 3.7 12/30/2020     12/30/2020    CO2 26 12/30/2020    BUN 14 12/30/2020    CREATININE 0.46 12/30/2020    GLUCOSE 91 12/30/2020    AST 21 12/30/2020    ALT 26 12/30/2020     Lab Results   Component Value Date    WBC 5.8 12/30/2020    RBC 4.43 12/30/2020    RBC 3.71 05/07/2012    HGB 14.3 12/30/2020    HCT 43.7 12/30/2020    MCV 98.6 12/30/2020    MCH 32.3 12/30/2020    MCHC 32.7 12/30/2020    RDW 12.3 12/30/2020     12/30/2020     05/07/2012    MPV 9.4 12/30/2020     Lab Results   Component Value Date    TSH 0.57 12/30/2020     Lab Results   Component Value Date    CHOL 182 12/30/2020    HDL 73 12/30/2020    LABA1C 4.4 12/30/2020       Assessment/Plan:      Diagnosis Orders   1.  Irritant contact dermatitis due to plants, except food         -Rx tapering oral prednisone. Reviewed side effects including insomnia. -Rx triamcinolone cream.  Discussed not to use on face or genital area. -Patient may continue calamine lotion, oral antihistamine, and oatmeal baths for symptom relief.  -Discussed possibility of rebound effect following discontinuation of steroids.  -Avoid itching.  -Notify office if symptoms worsen or persist.    Completed Refills   Requested Prescriptions     Signed Prescriptions Disp Refills    predniSONE (DELTASONE) 20 MG tablet 11 tablet 0     Sig: Take 2 tablets by mouth once daily x3 days. Then take 1 tablet by mouth once daily x3 days. Then take 1/2 tablet by mouth once daily x3 days.  triamcinolone (KENALOG) 0.1 % cream 1 Tube 1     Sig: Apply topically 2 times daily. Do not apply to face or genital area. No orders of the defined types were placed in this encounter. No results found for this visit on 08/03/21. Return if symptoms worsen or fail to improve.     Electronically signed by MARGARETTE Giordano CNP on 08/03/21 at 10:39 AM.

## 2021-08-03 NOTE — PATIENT INSTRUCTIONS
SURVEY:    You may be receiving a survey from [employer] regarding your visit today. Please complete the survey to enable us to provide the highest quality of care to you and your family. If you cannot score us a very good on any question, please call the office to discuss how we could have made your experience a very good one. Thank you.

## 2021-08-10 ENCOUNTER — TELEPHONE (OUTPATIENT)
Dept: OBGYN CLINIC | Age: 36
End: 2021-08-10

## 2021-08-10 ENCOUNTER — OFFICE VISIT (OUTPATIENT)
Dept: FAMILY MEDICINE CLINIC | Age: 36
End: 2021-08-10
Payer: COMMERCIAL

## 2021-08-10 ENCOUNTER — NURSE TRIAGE (OUTPATIENT)
Dept: OTHER | Facility: CLINIC | Age: 36
End: 2021-08-10

## 2021-08-10 VITALS
BODY MASS INDEX: 23.6 KG/M2 | DIASTOLIC BLOOD PRESSURE: 70 MMHG | OXYGEN SATURATION: 98 % | WEIGHT: 125 LBS | SYSTOLIC BLOOD PRESSURE: 112 MMHG | TEMPERATURE: 98.2 F | HEART RATE: 89 BPM | HEIGHT: 61 IN

## 2021-08-10 DIAGNOSIS — L24.7 IRRITANT CONTACT DERMATITIS DUE TO PLANTS, EXCEPT FOOD: Primary | ICD-10-CM

## 2021-08-10 PROCEDURE — G8427 DOCREV CUR MEDS BY ELIG CLIN: HCPCS | Performed by: NURSE PRACTITIONER

## 2021-08-10 PROCEDURE — 99213 OFFICE O/P EST LOW 20 MIN: CPT | Performed by: NURSE PRACTITIONER

## 2021-08-10 PROCEDURE — 1036F TOBACCO NON-USER: CPT | Performed by: NURSE PRACTITIONER

## 2021-08-10 PROCEDURE — G8420 CALC BMI NORM PARAMETERS: HCPCS | Performed by: NURSE PRACTITIONER

## 2021-08-10 RX ORDER — TRIAMCINOLONE ACETONIDE 1 MG/G
CREAM TOPICAL
Qty: 2 TUBE | Refills: 1 | Status: SHIPPED | OUTPATIENT
Start: 2021-08-10 | End: 2021-12-23 | Stop reason: ALTCHOICE

## 2021-08-10 RX ORDER — HYDROXYZINE HYDROCHLORIDE 25 MG/1
25 TABLET, FILM COATED ORAL EVERY 8 HOURS PRN
Qty: 15 TABLET | Refills: 0 | Status: SHIPPED | OUTPATIENT
Start: 2021-08-10 | End: 2021-08-20

## 2021-08-10 RX ORDER — PREDNISONE 10 MG/1
TABLET ORAL
Qty: 40 TABLET | Refills: 0 | Status: SHIPPED | OUTPATIENT
Start: 2021-08-10 | End: 2021-12-23 | Stop reason: ALTCHOICE

## 2021-08-10 ASSESSMENT — ENCOUNTER SYMPTOMS: SHORTNESS OF BREATH: 0

## 2021-08-10 NOTE — TELEPHONE ENCOUNTER
Received call from [facility] with [employer]. Brief description of triage: Woke today with right eye being swollen/puffy. She seen PCP last week for dermatitis and was given prednisone and a cream. Was better until today. No pain but feels weird. No fever, no itching. Triage indicates for patient to see today in office. Care advice provided, patient verbalizes understanding; denies any other questions or concerns; instructed to call back for any new or worsening symptoms. Writer provided warm transfer to [facility] for appointment scheduling. Attention Provider: Thank you for allowing me to participate in the care of your patient. The patient was connected to triage in response to information provided to the [facility]. Please do not respond through this encounter as the response is not directed to a shared pool. Reason for Disposition   MODERATE-SEVERE eyelid swelling on one side  (Exception: due to a mosquito bite)    Answer Assessment - Initial Assessment Questions  1. ONSET: \"When did the swelling start? \" (e.g., minutes, hours, days)      Woke up today with rash and right eye puffy  Took Benadryl and Claritin    2. LOCATION: \"What part of the eyelids is swollen? \"  Right eye is puffy under the eye    3. SEVERITY: \"How swollen is it? \"    Is puffy-not swollen shut    4. ITCHING: \"Is there any itching? \" If so, ask: \"How much? \"   (Scale 1-10; mild, moderate or severe)    No itching    5. PAIN: \"Is the swelling painful to touch? \" If so, ask: \"How painful is it? \"   (Scale 1-10; mild, moderate or severe)      No pain but feels weird    6. FEVER: \"Do you have a fever? \" If so, ask: \"What is it, how was it measured, and when did it start? \"       No fever    7. CAUSE: \"What do you think is causing the swelling? \"     Saw PCP last week, told she had dermatitis, given prednisone and a cream, things improved until now    8.  RECURRENT SYMPTOM: \"Have you had eyelid swelling before? \" If so, ask: \"When was the last time? \" \"What happened that time?\"    9. OTHER SYMPTOMS: \"Do you have any other symptoms? \" (e.g., blurred vision, eye discharge, rash, runny nose)   arms with rash  No breathing difficulties, no swollen tongue    10. PREGNANCY: \"Is there any chance you are pregnant? \" \"When was your last menstrual period? \"      no    Protocols used: [facility]

## 2021-08-10 NOTE — PROGRESS NOTES
Name: Stephanie Henning  : 1985         Chief Complaint:     Chief Complaint   Patient presents with    Rash     patient states her rash is getting worse. woke up this morning with right puffy eye. states the rash is very itchy. she was improving, but woke up this morning way worse. History of Present Illness: Stephanie Henning is a 38 y.o.  female who presents with Rash (patient states her rash is getting worse. woke up this morning with right puffy eye. states the rash is very itchy. she was improving, but woke up this morning way worse. )      HPI  The patient presents for rash f/u. She was seen in office 8/3/2021 and diagnosed with irritant contact dermatitis. This time, she was prescribed tapering oral prednisone and given Rx for topical triamcinolone cream.  Today, she states that her symptoms were improving until this morning. She has 1 day left of prednisone. This morning, she noticed worsening lesions on arms bilaterally, left leg, and right infra-orbital area. Admits swelling under her right eye this morning. Admits pruritis. She ran out of triamciminolone cream 2 days ago. She has been taking benadryl and Claritin. She is heading out of state to [place] tomorrow. Denies SOB, lip/tongue tingling, or throat swelling. Past Medical History:     Past Medical History:   Diagnosis Date    Arthritis     Asthma     Eczema       Reviewed all health maintenance requirements and ordered appropriate tests  Health Maintenance Due   Topic Date Due    Hepatitis C screen  Never done    HIV screen  Never done       Past Surgical History:     Past Surgical History:   Procedure Laterality Date    CHOLECYSTECTOMY      ENDOMETRIAL ABLATION      KNEE SURGERY Right     LEG SURGERY Left     15 surgical procedures.  TONSILLECTOMY          Medications:       Prior to Admission medications    Medication Sig Start Date End Date Taking?  Authorizing Provider   hydrOXYzine (ATARAX) 25 MG tablet Take 1 tablet by mouth every 8 hours as needed for Itching . CAUTION: this medication may cause sedation 8/10/21 8/20/21 Yes MARGARETTE Hudson CNP   triamcinolone (KENALOG) 0.1 % cream Apply topically 2 times daily. Do not apply to face or genital area. 8/10/21  Yes MARGARETTE Hudson CNP   predniSONE (DELTASONE) 10 MG tablet Take 4 tablets by mouth once daily for 4 days. Then take 3 tablets by mouth once daily for 4 days. Then take 2 tablets by mouth once daily for 4 days. Then take 1 tablet by mouth once daily for 4 days. 8/10/21  Yes MARGARETTE Hudson CNP   topiramate (TOPAMAX) 25 MG tablet TAKE 1 TABLET BY MOUTH TWICE DAILY 6/28/21  Yes Historical Provider, MD   naproxen (NAPROSYN) 500 MG tablet Take 1 tablet by mouth 2 times daily as needed for Pain 7/20/21  Yes Abelino Fuentes,    QUEtiapine (SEROQUEL) 50 MG tablet TAKE 1 TO 2 TABLETS BY MOUTH AT BEDTIME 4/22/21  Yes Historical Provider, MD   sertraline (ZOLOFT) 100 MG tablet TAKE 1 TABLET BY MOUTH ONCE DAILY 6/3/21  Yes Historical Provider, MD   ALPRAZolam Bianka Garcia) 0.25 MG tablet  12/14/20  Yes Historical Provider, MD   traZODone (DESYREL) 50 MG tablet Take 50 mg by mouth nightly   Yes Historical Provider, MD        Allergies:       Patient has no known allergies. Social History:     Tobacco:    reports that she has never smoked. She has never used smokeless tobacco.  Alcohol:      reports current alcohol use. Drug Use:  reports no history of drug use. Family History:     Family History   Problem Relation Age of Onset    Thyroid Disease Mother        Review of Systems:     Positive and Negative as described in HPI    Review of Systems   Respiratory: Negative for shortness of breath. Skin: Positive for rash.        Physical Exam:   Vitals:  /70   Pulse 89   Temp 98.2 °F (36.8 °C)   Ht 5' 1\" (1.549 m)   Wt 125 lb (56.7 kg)   SpO2 98%   BMI 23.62 kg/m²     Physical Exam  Constitutional:       General: She is not in acute distress. Appearance: Normal appearance. She is normal weight. She is not ill-appearing or toxic-appearing. HENT:      Head: Normocephalic. Cardiovascular:      Rate and Rhythm: Normal rate and regular rhythm. Heart sounds: Normal heart sounds. No murmur heard. Pulmonary:      Effort: Pulmonary effort is normal. No respiratory distress. Breath sounds: Normal breath sounds. No stridor. No wheezing, rhonchi or rales. Skin:     General: Skin is warm. Comments: Scattered erythematous papules in confluent and linear pattern on forearms bilaterally and left posterior lower extremity. Macular erythema and swelling located inferior to right orbit. Neurological:      Mental Status: She is alert and oriented to person, place, and time. Psychiatric:         Mood and Affect: Mood normal.         Behavior: Behavior normal.         Thought Content: Thought content normal.         Judgment: Judgment normal.         Data:     Lab Results   Component Value Date     12/30/2020    K 3.7 12/30/2020     12/30/2020    CO2 26 12/30/2020    BUN 14 12/30/2020    CREATININE 0.46 12/30/2020    GLUCOSE 91 12/30/2020    AST 21 12/30/2020    ALT 26 12/30/2020     Lab Results   Component Value Date    WBC 5.8 12/30/2020    RBC 4.43 12/30/2020    RBC 3.71 05/07/2012    HGB 14.3 12/30/2020    HCT 43.7 12/30/2020    MCV 98.6 12/30/2020    MCH 32.3 12/30/2020    MCHC 32.7 12/30/2020    RDW 12.3 12/30/2020     12/30/2020     05/07/2012    MPV 9.4 12/30/2020     Lab Results   Component Value Date    TSH 0.57 12/30/2020     Lab Results   Component Value Date    CHOL 182 12/30/2020    HDL 73 12/30/2020    LABA1C 4.4 12/30/2020       Assessment/Plan:      Diagnosis Orders   1. Irritant contact dermatitis due to plants, except food       -Discussed symptoms likely related to rebound effect with tapering oral prednisone.   Will refill oral prednisone with a longer taper.  -Refill triamcinolone

## 2021-08-10 NOTE — PATIENT INSTRUCTIONS
SURVEY:    You may be receiving a survey from [employer] regarding your visit today. Please complete the survey to enable us to provide the highest quality of care to you and your family. If you cannot score us a very good on any question, please call the office to discuss how we could of made your experience a very good one. Thank you.

## 2021-09-23 NOTE — TELEPHONE ENCOUNTER
I spoke to patient regarding surgery expectations and recovery. She is scheduled for a RA TLH, poss BSO, poss Lap Colpo at [facility] on 2/7/22. She is aware that a nurse from [facility] will call her to complete a phone interview. She has already received the COVID vaccine and aware that she currently does not need to have any pre-op COVID testing. 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 Patient will come in to see Dr. Isabel Varma prior to surgery. We will also follow up 4 weeks after surgery. Appointments scheduled. Patient verbalized understanding, no further questions/concerns voiced.

## 2021-10-07 ENCOUNTER — TELEPHONE (OUTPATIENT)
Dept: OBGYN CLINIC | Age: 36
End: 2021-10-07

## 2021-10-07 NOTE — TELEPHONE ENCOUNTER
Spoke to Nicholas at [facility] regarding patient's upcoming procedure ([id number]). The procedure does not require a PA. Reference # is [id number].

## 2021-12-23 ENCOUNTER — OFFICE VISIT (OUTPATIENT)
Dept: FAMILY MEDICINE CLINIC | Age: 36
End: 2021-12-23
Payer: COMMERCIAL

## 2021-12-23 ENCOUNTER — HOSPITAL ENCOUNTER (OUTPATIENT)
Age: 36
Discharge: HOME | End: 2021-12-23
Payer: COMMERCIAL

## 2021-12-23 VITALS
DIASTOLIC BLOOD PRESSURE: 76 MMHG | WEIGHT: 139.5 LBS | OXYGEN SATURATION: 98 % | TEMPERATURE: 97.5 F | HEIGHT: 61 IN | BODY MASS INDEX: 26.34 KG/M2 | HEART RATE: 75 BPM | SYSTOLIC BLOOD PRESSURE: 110 MMHG

## 2021-12-23 DIAGNOSIS — E78.1 HIGH TRIGLYCERIDES: ICD-10-CM

## 2021-12-23 DIAGNOSIS — R53.83 OTHER FATIGUE: ICD-10-CM

## 2021-12-23 DIAGNOSIS — R40.0 DAYTIME SOMNOLENCE: ICD-10-CM

## 2021-12-23 DIAGNOSIS — L30.9 ECZEMA, UNSPECIFIED TYPE: ICD-10-CM

## 2021-12-23 DIAGNOSIS — M25.572 LEFT ANKLE PAIN, UNSPECIFIED CHRONICITY: ICD-10-CM

## 2021-12-23 DIAGNOSIS — Z00.00 WELLNESS EXAMINATION: Primary | ICD-10-CM

## 2021-12-23 DIAGNOSIS — Z23 NEEDS FLU SHOT: ICD-10-CM

## 2021-12-23 DIAGNOSIS — Z00.00 WELLNESS EXAMINATION: ICD-10-CM

## 2021-12-23 LAB
ALT SERPL-CCNC: 14 U/L (ref 5–33)
ANION GAP SERPL CALCULATED.3IONS-SCNC: 11 MMOL/L (ref 9–17)
AST SERPL-CCNC: 17 U/L
BUN BLDV-MCNC: 16 MG/DL (ref 6–20)
BUN/CREAT BLD: 29 (ref 9–20)
CALCIUM SERPL-MCNC: 9.2 MG/DL (ref 8.6–10.4)
CHLORIDE BLD-SCNC: 105 MMOL/L (ref 98–107)
CHOLESTEROL/HDL RATIO: 3.5
CHOLESTEROL: 196 MG/DL
CO2: 22 MMOL/L (ref 20–31)
CREAT SERPL-MCNC: 0.55 MG/DL (ref 0.5–0.9)
GFR AFRICAN AMERICAN: >60 ML/MIN
GFR NON-AFRICAN AMERICAN: >60 ML/MIN
GFR SERPL CREATININE-BSD FRML MDRD: ABNORMAL ML/MIN/{1.73_M2}
GFR SERPL CREATININE-BSD FRML MDRD: ABNORMAL ML/MIN/{1.73_M2}
GLUCOSE BLD-MCNC: 90 MG/DL (ref 70–99)
HCT VFR BLD CALC: 43.7 % (ref 36.3–47.1)
HDLC SERPL-MCNC: 56 MG/DL
HEMOGLOBIN: 14.4 G/DL (ref 11.9–15.1)
LDL CHOLESTEROL: 111 MG/DL (ref 0–130)
MCH RBC QN AUTO: 31.4 PG (ref 25.2–33.5)
MCHC RBC AUTO-ENTMCNC: 33 G/DL (ref 28.4–34.8)
MCV RBC AUTO: 95.4 FL (ref 82.6–102.9)
NRBC AUTOMATED: 0 PER 100 WBC
PDW BLD-RTO: 12.2 % (ref 11.8–14.4)
PLATELET # BLD: 301 K/UL (ref 138–453)
PMV BLD AUTO: 10.2 FL (ref 8.1–13.5)
POTASSIUM SERPL-SCNC: 4.3 MMOL/L (ref 3.7–5.3)
RBC # BLD: 4.58 M/UL (ref 3.95–5.11)
SODIUM BLD-SCNC: 138 MMOL/L (ref 135–144)
TRIGL SERPL-MCNC: 144 MG/DL
TSH SERPL DL<=0.05 MIU/L-ACNC: 0.66 MIU/L (ref 0.3–5)
VLDLC SERPL CALC-MCNC: NORMAL MG/DL (ref 1–30)
WBC # BLD: 6 K/UL (ref 3.5–11.3)

## 2021-12-23 PROCEDURE — 99214 OFFICE O/P EST MOD 30 MIN: CPT | Performed by: NURSE PRACTITIONER

## 2021-12-23 PROCEDURE — G8428 CUR MEDS NOT DOCUMENT: HCPCS | Performed by: NURSE PRACTITIONER

## 2021-12-23 PROCEDURE — 84443 ASSAY THYROID STIM HORMONE: CPT

## 2021-12-23 PROCEDURE — 85027 COMPLETE CBC AUTOMATED: CPT

## 2021-12-23 PROCEDURE — 99395 PREV VISIT EST AGE 18-39: CPT | Performed by: NURSE PRACTITIONER

## 2021-12-23 PROCEDURE — 36415 COLL VENOUS BLD VENIPUNCTURE: CPT

## 2021-12-23 PROCEDURE — G8482 FLU IMMUNIZE ORDER/ADMIN: HCPCS | Performed by: NURSE PRACTITIONER

## 2021-12-23 PROCEDURE — 80061 LIPID PANEL: CPT

## 2021-12-23 PROCEDURE — 84450 TRANSFERASE (AST) (SGOT): CPT

## 2021-12-23 PROCEDURE — 90674 CCIIV4 VAC NO PRSV 0.5 ML IM: CPT | Performed by: NURSE PRACTITIONER

## 2021-12-23 PROCEDURE — G8419 CALC BMI OUT NRM PARAM NOF/U: HCPCS | Performed by: NURSE PRACTITIONER

## 2021-12-23 PROCEDURE — 80048 BASIC METABOLIC PNL TOTAL CA: CPT

## 2021-12-23 PROCEDURE — 84460 ALANINE AMINO (ALT) (SGPT): CPT

## 2021-12-23 PROCEDURE — 1036F TOBACCO NON-USER: CPT | Performed by: NURSE PRACTITIONER

## 2021-12-23 PROCEDURE — 90471 IMMUNIZATION ADMIN: CPT | Performed by: NURSE PRACTITIONER

## 2021-12-23 ASSESSMENT — ENCOUNTER SYMPTOMS
WHEEZING: 0
RHINORRHEA: 0
COUGH: 0
DIARRHEA: 0
SORE THROAT: 0
SHORTNESS OF BREATH: 0
CONSTIPATION: 0
ABDOMINAL PAIN: 0

## 2021-12-23 NOTE — PATIENT INSTRUCTIONS
SURVEY:    You may be receiving a survey from [employer] regarding your visit today. Please complete the survey to enable us to provide the highest quality of care to you and your family. If you cannot score us a very good on any question, please call the office to discuss how we could of made your experience a very good one. Thank you.

## 2021-12-23 NOTE — PROGRESS NOTES
Name: Julien Sierra  : 1985         Chief Complaint:     Chief Complaint   Patient presents with    Annual Exam     pt presents for annual wellness exam, wants to discuss dry skin patches, blotchy spots on her neck, lt ankle pain, wants flu shot       History of Present Illness: Julien Sierra is a 38 y.o.  female who presents with Annual Exam (pt presents for annual wellness exam, wants to discuss dry skin patches, blotchy spots on her neck, lt ankle pain, wants flu shot)      HPI     Wellness: The patient presents for wellness exam. She denies a well-balanced diet. She states her exercise is \"hit or miss\". She follows routinely with an eye doctor. She is overdue to see a dentist.  Flu vaccination administered today in office. She is up-to-date on Covid vaccine. Most recent Pap smear 2021. She follows with Dr. Giorgio Carson for women's health and is scheduled for a partial hysterectomy 2022. Most recent Tdap unknown. Rash:  Admits \"dry patches\" located on her chest and left upper arm. She admits chronic similar findings that occur mostly in the winter. Current symptoms began 2 weeks ago. Denies new lotions or soaps. She uses Aveeno body wash and lotion. She has used hydrocortisone cream with mild relief. Rash is pruritic occasionally but not painful. Left ankle pain:  Patient has a complex orthopedic history including several corrective surgeries on her left leg/ankle. She states that her left ankle pain has worsened recently and is becoming \"very sore and stiff\". Pain is described as a dull ache and is worse with walking. She wears orthotics routinely.     Past Medical History:     Past Medical History:   Diagnosis Date    Arthritis     Asthma     Eczema       Reviewed all health maintenance requirements and ordered appropriate tests  Health Maintenance Due   Topic Date Due    Hepatitis C screen  Never done    HIV screen  Never done    COVID-19 Vaccine (3 - Booster for Naif Head series) 09/18/2021       Past Surgical History:     Past Surgical History:   Procedure Laterality Date    CHOLECYSTECTOMY      ENDOMETRIAL ABLATION      KNEE SURGERY Right     LEG SURGERY Left     15 surgical procedures.  TONSILLECTOMY          Medications:       Prior to Admission medications    Medication Sig Start Date End Date Taking? Authorizing Provider   topiramate (TOPAMAX) 25 MG tablet TAKE 1 TABLET BY MOUTH TWICE DAILY 6/28/21  Yes Historical Provider, MD   naproxen (NAPROSYN) 500 MG tablet Take 1 tablet by mouth 2 times daily as needed for Pain 7/20/21  Yes Terrell Mora,    QUEtiapine (SEROQUEL) 50 MG tablet TAKE 1 TO 2 TABLETS BY MOUTH AT BEDTIME 4/22/21  Yes Historical Provider, MD   sertraline (ZOLOFT) 100 MG tablet TAKE 1 TABLET BY MOUTH ONCE DAILY 6/3/21  Yes Historical Provider, MD   ALPRAZolam An Balboa) 0.25 MG tablet  12/14/20  Yes Historical Provider, MD        Allergies:       Patient has no known allergies. Social History:     Tobacco:    reports that she has never smoked. She has never used smokeless tobacco.  Alcohol:      reports current alcohol use. Drug Use:  reports no history of drug use. Family History:     Family History   Problem Relation Age of Onset    Thyroid Disease Mother        Review of Systems:     Positive and Negative as described in HPI    Review of Systems   Constitutional: Negative for chills and fever. HENT: Negative for congestion, rhinorrhea and sore throat. Respiratory: Negative for cough, shortness of breath and wheezing. Cardiovascular: Negative for chest pain and palpitations. Gastrointestinal: Negative for abdominal pain, constipation and diarrhea. Musculoskeletal: Positive for arthralgias. Skin: Positive for rash. Neurological: Negative for dizziness, light-headedness and headaches.        Physical Exam:   Vitals:  /76   Pulse 75   Temp 97.5 °F (36.4 °C)   Ht 5' 0.98\" (1.549 m)   Wt 139 lb 8 oz (63.3 kg)   SpO2 98%   BMI 26.37 kg/m²     Physical Exam  Constitutional:       General: She is not in acute distress. Appearance: Normal appearance. She is normal weight. She is not ill-appearing or toxic-appearing. HENT:      Head: Normocephalic. Right Ear: Tympanic membrane, ear canal and external ear normal. There is no impacted cerumen. Left Ear: Tympanic membrane, ear canal and external ear normal. There is no impacted cerumen. Nose: Nose normal. No congestion or rhinorrhea. Mouth/Throat:      Mouth: Mucous membranes are moist.      Pharynx: No oropharyngeal exudate or posterior oropharyngeal erythema. Cardiovascular:      Rate and Rhythm: Normal rate and regular rhythm. Heart sounds: Normal heart sounds. No murmur heard. Pulmonary:      Effort: Pulmonary effort is normal. No respiratory distress. Breath sounds: Normal breath sounds. No stridor. No wheezing, rhonchi or rales. Abdominal:      General: Abdomen is flat. Bowel sounds are normal. There is no distension. Palpations: Abdomen is soft. There is no mass. Tenderness: There is no abdominal tenderness. There is no guarding. Hernia: No hernia is present. Musculoskeletal:      Cervical back: Neck supple. Comments: Surgical changes to left ankle. Pain with flexion and extension. No swelling or skin changes. Lymphadenopathy:      Cervical: No cervical adenopathy. Skin:     General: Skin is warm. Comments: Scattered erythematous patches with erythematous papules located on chest and left upper arm. No white scaling. Neurological:      Mental Status: She is alert and oriented to person, place, and time. Psychiatric:         Mood and Affect: Mood normal.         Behavior: Behavior normal.         Thought Content:  Thought content normal.         Judgment: Judgment normal.         Data:     Lab Results   Component Value Date     12/23/2021    K 4.3 12/23/2021     12/23/2021    CO2 22 12/23/2021    BUN 16 12/23/2021    CREATININE 0.55 12/23/2021    GLUCOSE 90 12/23/2021    AST 17 12/23/2021    ALT 14 12/23/2021     Lab Results   Component Value Date    WBC 6.0 12/23/2021    RBC 4.58 12/23/2021    RBC 3.71 05/07/2012    HGB 14.4 12/23/2021    HCT 43.7 12/23/2021    MCV 95.4 12/23/2021    MCH 31.4 12/23/2021    MCHC 33.0 12/23/2021    RDW 12.2 12/23/2021     12/23/2021     05/07/2012    MPV 10.2 12/23/2021     Lab Results   Component Value Date    TSH 0.66 12/23/2021     Lab Results   Component Value Date    CHOL 182 12/30/2020    HDL 73 12/30/2020    LABA1C 4.4 12/30/2020       Assessment/Plan:      Diagnosis Orders   1. Wellness examination  ALT    AST    Basic Metabolic Panel    CBC    Lipid Panel    TSH With Reflex Ft4   2. Needs flu shot  INFLUENZA, MDCK QUADV, 2 YRS AND OLDER, IM, PF, PREFILL SYR OR SDV, 0.5ML (FLUCELVAX QUADV, PF)   3. Other fatigue  CBC    TSH With Reflex Ft4   4. High triglycerides  Lipid Panel   5. Daytime somnolence  Home Sleep Study    Baseline Diagnostic Sleep Study    Sleep Study with PAP Titration   6. Eczema, unspecified type     7. Left ankle pain, unspecified chronicity       Wellness:  -Counseled on well-balanced diet and routine physical exercise  -Counseled on routine eye and dental appointments  -Flu vaccine administered today in office  -She is UTD on Covid vaccine and planning to get the booster  -She is due for tetanus vaccine. She may complete this at local health department    Fatigue:  -TFTs 2020 WNL. Will recheck today.  -Patient is agreeable to sleep study. This was ordered today. Left ankle pain:  -Patient has a very complex orthopedic history. We discussed various options including topical Voltaren gel, oral NSAIDs, imaging, physical therapy, etc.  Patient prefers to follow with orthopedics. I encouraged her to schedule an appointment with [facility].     Eczema:  -Encouraged warm showers/baths, emollient use, and triamcinolone cream.  She confirms she has triamcinolone cream at home.  -Notify office if this worsens or persists    Completed Refills   Requested Prescriptions      No prescriptions requested or ordered in this encounter       Orders Placed This Encounter   Procedures    INFLUENZA, MDCK QUADV, 2 YRS AND OLDER, IM, PF, PREFILL SYR OR SDV, 0.5ML (FLUCELVAX QUADV, PF)    ALT     Standing Status:   Future     Number of Occurrences:   1     Standing Expiration Date:   12/23/2022    AST     Standing Status:   Future     Number of Occurrences:   1     Standing Expiration Date:   12/23/2022    Basic Metabolic Panel     Standing Status:   Future     Number of Occurrences:   1     Standing Expiration Date:   12/23/2022    CBC     Standing Status:   Future     Number of Occurrences:   1     Standing Expiration Date:   12/23/2022    Lipid Panel     Standing Status:   Future     Number of Occurrences:   1     Standing Expiration Date:   12/23/2022     Order Specific Question:   Is Patient Fasting?/# of Hours     Answer:   fasting    TSH With Reflex Ft4     Standing Status:   Future     Number of Occurrences:   1     Standing Expiration Date:   12/23/2022    Home Sleep Study     Standing Status:   Future     Standing Expiration Date:   12/23/2022     Order Specific Question:   Location For Sleep Study     Answer:   [facility]     Order Specific Question:   Select a sleep lab location     Answer:   [address] [place] [address] Diagnostic Sleep Study     Standing Status:   Future     Standing Expiration Date:   12/23/2022     Order Specific Question:   Adult or Pediatric     Answer:   Adult Study (>7 Years)     Order Specific Question:   Location For Sleep Study     Answer:   [facility]     Order Specific Question:   Select a sleep lab location     Answer:   [facility]    Sleep Study with PAP Titration     Standing Status:   Future     Standing Expiration Date:   12/23/2022     Order Specific Question:   Sleep Study Titration Type     Answer:   CPAP     Order Specific Question:   Location For Sleep Study     Answer:   [facility]     Order Specific Question:   Select a sleep lab location     Answer:   [facility]        No results found for this visit on 12/23/21. Return in 1 year (on 12/23/2022), or if symptoms worsen or fail to improve.     Electronically signed by MARGARETTE Pollard CNP on 12/23/21 at 4:41 PM.

## 2022-01-25 ENCOUNTER — TELEPHONE (OUTPATIENT)
Dept: OBGYN CLINIC | Age: 37
End: 2022-01-25

## 2022-01-25 RX ORDER — ELECTROLYTES/DEXTROSE
SOLUTION, ORAL ORAL
COMMUNITY

## 2022-01-25 NOTE — TELEPHONE ENCOUNTER
I was given a message from Myrna at [facility] presurgery, patient tested positive for COVID on 1/18 and was symptomatic at the time. She is scheduled for a TLH on 2/7 and needs to be postponed until at least 28 days after surgery. She is moved to 3/28 and visits adjusted. Patient verbalized understanding, no further questions/concerns voiced.

## 2022-01-31 ENCOUNTER — HOSPITAL ENCOUNTER (OUTPATIENT)
Dept: SLEEP CENTER | Age: 37
Discharge: HOME | End: 2022-01-31
Payer: COMMERCIAL

## 2022-01-31 VITALS — BODY MASS INDEX: 27.29 KG/M2 | HEIGHT: 60 IN | WEIGHT: 139 LBS

## 2022-01-31 DIAGNOSIS — R40.0 DAYTIME SOMNOLENCE: ICD-10-CM

## 2022-01-31 PROCEDURE — 95810 POLYSOM 6/> YRS 4/> PARAM: CPT

## 2022-01-31 ASSESSMENT — SLEEP AND FATIGUE QUESTIONNAIRES
HOW LIKELY ARE YOU TO NOD OFF OR FALL ASLEEP WHILE WATCHING TV: 2
HOW LIKELY ARE YOU TO NOD OFF OR FALL ASLEEP WHILE SITTING AND READING: 2
DO YOU SNORE: YES
HOW LIKELY ARE YOU TO NOD OFF OR FALL ASLEEP IN A CAR, WHILE STOPPED FOR A FEW MINUTES IN TRAFFIC: 0
ESS TOTAL SCORE: 6
HAS ANYONE NOTICED THAT YOU QUIT BREATHING DURING SLEEP: NEVER/ALMOST NEVER
HOW LIKELY ARE YOU TO NOD OFF OR FALL ASLEEP WHILE SITTING AND TALKING TO SOMEONE: 0
HOW LIKELY ARE YOU TO NOD OFF OR FALL ASLEEP WHILE SITTING INACTIVE IN A PUBLIC PLACE: 0
HOW LIKELY ARE YOU TO NOD OFF OR FALL ASLEEP WHEN YOU ARE A PASSENGER IN A CAR FOR AN HOUR WITHOUT A BREAK: 1
ARE YOU TIRED AFTER SLEEPING: ALMOST DAILY
DOES YOUR SNORING BOTHER OTHERS: YES
ARE YOU TIRED DURING WAKE TIME: ALMOST DAILY
HAVE YOU EVER NODDED OFF OR FALLEN ASLEEP WHILE DRIVING: NO
HOW LIKELY ARE YOU TO NOD OFF OR FALL ASLEEP WHILE SITTING QUIETLY AFTER LUNCH WITHOUT ALCOHOL: 0
DO YOU HAVE HIGH BLOOD PRESSURE: NO
HOW LIKELY ARE YOU TO NOD OFF OR FALL ASLEEP WHILE LYING DOWN TO REST IN THE AFTERNOON WHEN CIRCUMSTANCES PERMIT: 1
SNORING VOLUME: AS LOUD AS TALKING
HOW OFTEN DO YOU SNORE: 3-4 TIMES A WEEK

## 2022-02-01 ENCOUNTER — HOSPITAL ENCOUNTER (OUTPATIENT)
Dept: CT IMAGING | Age: 37
Discharge: HOME | End: 2022-02-03
Payer: COMMERCIAL

## 2022-02-01 DIAGNOSIS — M21.962 DEFORMITY OF ANKLE JOINT, LEFT: ICD-10-CM

## 2022-02-01 LAB — STATUS: NORMAL

## 2022-02-01 PROCEDURE — 73700 CT LOWER EXTREMITY W/O DYE: CPT

## 2022-02-07 ENCOUNTER — OFFICE VISIT (OUTPATIENT)
Dept: FAMILY MEDICINE CLINIC | Age: 37
End: 2022-02-07
Payer: COMMERCIAL

## 2022-02-07 VITALS — RESPIRATION RATE: 14 BRPM | HEIGHT: 60 IN | WEIGHT: 139 LBS | BODY MASS INDEX: 27.29 KG/M2

## 2022-02-07 DIAGNOSIS — G25.81 RESTLESS LEG SYNDROME: Primary | ICD-10-CM

## 2022-02-07 PROCEDURE — 1036F TOBACCO NON-USER: CPT | Performed by: NURSE PRACTITIONER

## 2022-02-07 PROCEDURE — G8427 DOCREV CUR MEDS BY ELIG CLIN: HCPCS | Performed by: NURSE PRACTITIONER

## 2022-02-07 PROCEDURE — G8419 CALC BMI OUT NRM PARAM NOF/U: HCPCS | Performed by: NURSE PRACTITIONER

## 2022-02-07 PROCEDURE — 99213 OFFICE O/P EST LOW 20 MIN: CPT | Performed by: NURSE PRACTITIONER

## 2022-02-07 PROCEDURE — G8482 FLU IMMUNIZE ORDER/ADMIN: HCPCS | Performed by: NURSE PRACTITIONER

## 2022-02-07 RX ORDER — ROPINIROLE 0.25 MG/1
TABLET, FILM COATED ORAL
Qty: 30 TABLET | Refills: 5 | Status: SHIPPED | OUTPATIENT
Start: 2022-02-07 | End: 2022-08-18

## 2022-02-07 NOTE — PROGRESS NOTES
Name: Olinda Mancini  : 1985         Chief Complaint:     Chief Complaint   Patient presents with    Other     patient comes in to discuss sleep study results. History of Present Illness: Olinda Mancini is a 39 y.o.  female who presents with Other (patient comes in to discuss sleep study results. )      HPI  The patient presents to discuss sleep study results. She underwent sleep study with [facility]. Results showed periodic limb movement disorder. She states her sleep is \"hit or miss\". She admits to troubles falling asleep and staying asleep. She admits 2-3 nighttime awakenings on average. She admits having a nighttime routine prior to bedtime and averages a 9 PM bedtime. Denies nocturia. Denies racing thoughts prior to bed. Her  confirms she does snore at night. She does note that she has restless legs and feel like she \"cannot hold still\". Past Medical History:     Past Medical History:   Diagnosis Date    Anxiety     Arthritis     Asthma     Depression     Eczema       Reviewed all health maintenance requirements and ordered appropriate tests  Health Maintenance Due   Topic Date Due    Hepatitis C screen  Never done    HIV screen  Never done    COVID-19 Vaccine (3 - Booster for Pfizer series) 2021       Past Surgical History:     Past Surgical History:   Procedure Laterality Date    CHOLECYSTECTOMY      ENDOMETRIAL ABLATION      KNEE SURGERY Right     LEG SURGERY Left     15 surgical procedures.  TONSILLECTOMY          Medications:       Prior to Admission medications    Medication Sig Start Date End Date Taking? Authorizing Provider   rOPINIRole (REQUIP) 0.25 MG tablet Take 1 tablet by mouth 1 hour prior to bedtime.  22  Yes Yue Reina APRN - CNP   Multiple Vitamin (MULTIVITAMIN ADULT) TABS Take by mouth   Yes Historical Provider, MD   topiramate (TOPAMAX) 25 MG tablet TAKE 1 TABLET BY MOUTH TWICE DAILY 21  Yes Historical Provider, MD   naproxen (NAPROSYN) 500 MG tablet Take 1 tablet by mouth 2 times daily as needed for Pain 7/20/21  Yes Cristian Hunger, DO   QUEtiapine (SEROQUEL) 50 MG tablet nightly as needed  4/22/21  Yes Historical Provider, MD   sertraline (ZOLOFT) 100 MG tablet TAKE 1 TABLET BY MOUTH ONCE DAILY 6/3/21  Yes Historical Provider, MD   ALPRAZolam (XANAX) 0.25 MG tablet Take 0.25 mg by mouth as needed. 12/14/20  Yes Historical Provider, MD        Allergies:       Patient has no known allergies. Social History:     Tobacco:    reports that she has quit smoking. She has never used smokeless tobacco.  Alcohol:      reports current alcohol use. Drug Use:  reports no history of drug use. Family History:     Family History   Problem Relation Age of Onset    Thyroid Disease Mother        Review of Systems:     Positive and Negative as described in HPI    Review of Systems   Psychiatric/Behavioral: Positive for sleep disturbance. Physical Exam:   Vitals:  Resp 14   Ht 5' (1.524 m)   Wt 139 lb (63 kg)   LMP 01/15/2022   BMI 27.15 kg/m²     Physical Exam  Constitutional:       General: She is not in acute distress. Appearance: Normal appearance. She is normal weight. She is not ill-appearing or toxic-appearing. HENT:      Head: Normocephalic. Neurological:      Mental Status: She is alert and oriented to person, place, and time. Psychiatric:         Mood and Affect: Mood normal.         Behavior: Behavior normal.         Thought Content:  Thought content normal.         Judgment: Judgment normal.         Data:     Lab Results   Component Value Date     12/23/2021    K 4.3 12/23/2021     12/23/2021    CO2 22 12/23/2021    BUN 16 12/23/2021    CREATININE 0.55 12/23/2021    GLUCOSE 90 12/23/2021    AST 17 12/23/2021    ALT 14 12/23/2021     Lab Results   Component Value Date    WBC 6.0 12/23/2021    RBC 4.58 12/23/2021    RBC 3.71 05/07/2012    HGB 14.4 12/23/2021    HCT 43.7

## 2022-02-07 NOTE — PATIENT INSTRUCTIONS
SURVEY:    You may be receiving a survey from [employer] regarding your visit today. Please complete the survey to enable us to provide the highest quality of care to you and your family. If you cannot score us a very good on any question, please call the office to discuss how we could of made your experience a very good one. Thank you.

## 2022-03-15 ENCOUNTER — OFFICE VISIT (OUTPATIENT)
Dept: OBGYN | Age: 37
End: 2022-03-15
Payer: COMMERCIAL

## 2022-03-15 VITALS
BODY MASS INDEX: 26.31 KG/M2 | WEIGHT: 134 LBS | DIASTOLIC BLOOD PRESSURE: 68 MMHG | HEIGHT: 60 IN | SYSTOLIC BLOOD PRESSURE: 104 MMHG

## 2022-03-15 DIAGNOSIS — R10.2 PELVIC PAIN IN FEMALE: Primary | ICD-10-CM

## 2022-03-15 DIAGNOSIS — N80.03 ADENOMYOSIS: ICD-10-CM

## 2022-03-15 PROCEDURE — 99213 OFFICE O/P EST LOW 20 MIN: CPT | Performed by: OBSTETRICS & GYNECOLOGY

## 2022-03-15 PROCEDURE — G8419 CALC BMI OUT NRM PARAM NOF/U: HCPCS | Performed by: OBSTETRICS & GYNECOLOGY

## 2022-03-15 PROCEDURE — 1036F TOBACCO NON-USER: CPT | Performed by: OBSTETRICS & GYNECOLOGY

## 2022-03-15 PROCEDURE — G8427 DOCREV CUR MEDS BY ELIG CLIN: HCPCS | Performed by: OBSTETRICS & GYNECOLOGY

## 2022-03-15 PROCEDURE — G8482 FLU IMMUNIZE ORDER/ADMIN: HCPCS | Performed by: OBSTETRICS & GYNECOLOGY

## 2022-03-18 NOTE — PROGRESS NOTES
DATE OF VISIT:  3/18/22    PATIENT NAME:  Angel Landers     YOB: 1985    REASON FOR VISIT:    Chief Complaint   Patient presents with    Pre-op Exam     Patient is being seen for pre-op exam, Jolanta Degroot 105 3/28/22. Patient is feeling well. HISTORY OF PRESENT ILLNESS:  Pt continues to have pelvic pain/cramping the day before and the 3 days of her cycle; cycles arent heavy since her ablation but now painful; disc'd nsaid, POP, v considering tlh if impact on quality of life warrants; pt initially just tried nsaid and expectant management but now interested in definitive treatment      Patient's last menstrual period was 03/14/2022. Vitals:    03/15/22 1615   BP: 104/68   Weight: 134 lb (60.8 kg)   Height: 5' (1.524 m)     Body mass index is 26.17 kg/m². No Known Allergies  Current Outpatient Medications   Medication Sig Dispense Refill    rOPINIRole (REQUIP) 0.25 MG tablet Take 1 tablet by mouth 1 hour prior to bedtime. 30 tablet 5    Multiple Vitamin (MULTIVITAMIN ADULT) TABS Take by mouth      topiramate (TOPAMAX) 25 MG tablet TAKE 1 TABLET BY MOUTH TWICE DAILY      naproxen (NAPROSYN) 500 MG tablet Take 1 tablet by mouth 2 times daily as needed for Pain 30 tablet 5    QUEtiapine (SEROQUEL) 50 MG tablet nightly as needed       sertraline (ZOLOFT) 100 MG tablet TAKE 1 TABLET BY MOUTH ONCE DAILY      ALPRAZolam (XANAX) 0.25 MG tablet Take 0.25 mg by mouth as needed. No current facility-administered medications for this visit.      Social History     Socioeconomic History    Marital status:      Spouse name: None    Number of children: None    Years of education: None    Highest education level: None   Occupational History    None   Tobacco Use    Smoking status: Former Smoker    Smokeless tobacco: Never Used   Vaping Use    Vaping Use: Never used   Substance and Sexual Activity    Alcohol use: Yes     Comment: social    Drug use: Never    Sexual activity: Yes Partners: Male     Birth control/protection: Surgical   Other Topics Concern    None   Social History Narrative    None     Social Determinants of Health     Financial Resource Strain:     Difficulty of Paying Living Expenses: Not on file   Food Insecurity:     Worried About Running Out of Food in the Last Year: Not on file    Cecelia of Food in the Last Year: Not on file   Transportation Needs:     Lack of Transportation (Medical): Not on file    Lack of Transportation (Non-Medical): Not on file   Physical Activity:     Days of Exercise per Week: Not on file    Minutes of Exercise per Session: Not on file   Stress:     Feeling of Stress : Not on file   Social Connections:     Frequency of Communication with Friends and Family: Not on file    Frequency of Social Gatherings with Friends and Family: Not on file    Attends Quaker Services: Not on file    Active Member of [address] [place] [address] [employer] or Organizations: Not on file    Attends Club or Organization Meetings: Not on file    Marital Status: Not on file   Intimate Partner Violence:     Fear of Current or Ex-Partner: Not on file    Emotionally Abused: Not on file    Physically Abused: Not on file    Sexually Abused: Not on file   Housing Stability:     Unable to Pay for Housing in the Last Year: Not on file    Number of [place] in the Last Year: Not on file    Unstable Housing in the Last Year: Not on file       REVIEW OF SYSTEMS:  Review of Systems   Constitutional: Negative for chills and fever. Genitourinary: Positive for pelvic pain. Negative for menstrual problem. PHYSICAL EXAM:  /68   Ht 5' (1.524 m)   Wt 134 lb (60.8 kg)   LMP 03/14/2022   BMI 26.17 kg/m²   Physical Exam  Constitutional:       Appearance: Normal appearance. HENT:      Head: Normocephalic and atraumatic. Eyes:      Extraocular Movements: Extraocular movements intact. Pupils: Pupils are equal, round, and reactive to light.    Cardiovascular:      Rate and Rhythm: Normal rate. Pulmonary:      Effort: Pulmonary effort is normal.   Musculoskeletal:         General: Normal range of motion. Neurological:      Mental Status: She is alert and oriented to person, place, and time. Skin:     General: Skin is warm and dry. Psychiatric:         Mood and Affect: Mood normal.         Behavior: Behavior normal.         Thought Content: Thought content normal.         Judgment: Judgment normal.       The patient, Irvin Robertson is a 39 y.o. female, was seen with a total time spent of 20 minutes for the visit on this date of service by the E/M provider. The time component had both face to face and non face to face time spent in determining the total time component. Counseling and education regarding her diagnosis listed below and her options regarding those diagnoses were also included in determining her time component. Diagnosis Orders   1. Pelvic pain in female     2. Adenomyosis          The patient had her preventative health maintenance recommendations and follow-up reviewed with her at the completion of her visit. ASSESSMENT:      1. Pelvic pain in female    2. Adenomyosis        PLAN:  No orders of the defined types were placed in this encounter. Return in about 2 weeks (around 3/29/2022) for RA TLH/poss BSO.        Electronically signed by Chelo Hampton DO on 03/18/22

## 2022-03-25 ENCOUNTER — ANESTHESIA EVENT (OUTPATIENT)
Dept: OPERATING ROOM | Age: 37
End: 2022-03-25
Payer: COMMERCIAL

## 2022-03-28 ENCOUNTER — HOSPITAL ENCOUNTER (OUTPATIENT)
Age: 37
Setting detail: OUTPATIENT SURGERY
Discharge: HOME | End: 2022-03-28
Attending: OBSTETRICS & GYNECOLOGY | Admitting: OBSTETRICS & GYNECOLOGY
Payer: COMMERCIAL

## 2022-03-28 ENCOUNTER — ANESTHESIA (OUTPATIENT)
Dept: OPERATING ROOM | Age: 37
End: 2022-03-28
Payer: COMMERCIAL

## 2022-03-28 VITALS
DIASTOLIC BLOOD PRESSURE: 78 MMHG | HEART RATE: 86 BPM | RESPIRATION RATE: 16 BRPM | OXYGEN SATURATION: 97 % | WEIGHT: 134.4 LBS | HEIGHT: 61 IN | TEMPERATURE: 97.5 F | SYSTOLIC BLOOD PRESSURE: 124 MMHG | BODY MASS INDEX: 25.37 KG/M2

## 2022-03-28 VITALS — SYSTOLIC BLOOD PRESSURE: 100 MMHG | OXYGEN SATURATION: 100 % | DIASTOLIC BLOOD PRESSURE: 69 MMHG

## 2022-03-28 DIAGNOSIS — G89.18 POST-OP PAIN: Primary | ICD-10-CM

## 2022-03-28 PROCEDURE — 3700000000 HC ANESTHESIA ATTENDED CARE: Performed by: OBSTETRICS & GYNECOLOGY

## 2022-03-28 PROCEDURE — 2500000003 HC RX 250 WO HCPCS: Performed by: NURSE ANESTHETIST, CERTIFIED REGISTERED

## 2022-03-28 PROCEDURE — 2580000003 HC RX 258: Performed by: NURSE ANESTHETIST, CERTIFIED REGISTERED

## 2022-03-28 PROCEDURE — 7100000010 HC PHASE II RECOVERY - FIRST 15 MIN: Performed by: OBSTETRICS & GYNECOLOGY

## 2022-03-28 PROCEDURE — 88307 TISSUE EXAM BY PATHOLOGIST: CPT

## 2022-03-28 PROCEDURE — 6360000002 HC RX W HCPCS: Performed by: NURSE ANESTHETIST, CERTIFIED REGISTERED

## 2022-03-28 PROCEDURE — 58571 TLH W/T/O 250 G OR LESS: CPT

## 2022-03-28 PROCEDURE — 6370000000 HC RX 637 (ALT 250 FOR IP): Performed by: NURSE ANESTHETIST, CERTIFIED REGISTERED

## 2022-03-28 PROCEDURE — 7100000001 HC PACU RECOVERY - ADDTL 15 MIN: Performed by: OBSTETRICS & GYNECOLOGY

## 2022-03-28 PROCEDURE — 3600000019 HC SURGERY ROBOT ADDTL 15MIN: Performed by: OBSTETRICS & GYNECOLOGY

## 2022-03-28 PROCEDURE — 7100000011 HC PHASE II RECOVERY - ADDTL 15 MIN: Performed by: OBSTETRICS & GYNECOLOGY

## 2022-03-28 PROCEDURE — S2900 ROBOTIC SURGICAL SYSTEM: HCPCS | Performed by: OBSTETRICS & GYNECOLOGY

## 2022-03-28 PROCEDURE — 2709999900 HC NON-CHARGEABLE SUPPLY: Performed by: OBSTETRICS & GYNECOLOGY

## 2022-03-28 PROCEDURE — 7100000000 HC PACU RECOVERY - FIRST 15 MIN: Performed by: OBSTETRICS & GYNECOLOGY

## 2022-03-28 PROCEDURE — 6360000002 HC RX W HCPCS

## 2022-03-28 PROCEDURE — 3700000001 HC ADD 15 MINUTES (ANESTHESIA): Performed by: OBSTETRICS & GYNECOLOGY

## 2022-03-28 PROCEDURE — 3600000009 HC SURGERY ROBOT BASE: Performed by: OBSTETRICS & GYNECOLOGY

## 2022-03-28 PROCEDURE — 58571 TLH W/T/O 250 G OR LESS: CPT | Performed by: OBSTETRICS & GYNECOLOGY

## 2022-03-28 PROCEDURE — A4216 STERILE WATER/SALINE, 10 ML: HCPCS | Performed by: NURSE ANESTHETIST, CERTIFIED REGISTERED

## 2022-03-28 PROCEDURE — 64488 TAP BLOCK BI INJECTION: CPT | Performed by: NURSE ANESTHETIST, CERTIFIED REGISTERED

## 2022-03-28 RX ORDER — ROPIVACAINE HYDROCHLORIDE 5 MG/ML
INJECTION, SOLUTION EPIDURAL; INFILTRATION; PERINEURAL PRN
Status: DISCONTINUED | OUTPATIENT
Start: 2022-03-28 | End: 2022-03-28 | Stop reason: SDUPTHER

## 2022-03-28 RX ORDER — SODIUM CHLORIDE 9 MG/ML
INJECTION INTRAVENOUS PRN
Status: DISCONTINUED | OUTPATIENT
Start: 2022-03-28 | End: 2022-03-28 | Stop reason: SDUPTHER

## 2022-03-28 RX ORDER — SODIUM CHLORIDE 0.9 % (FLUSH) 0.9 %
5-40 SYRINGE (ML) INJECTION PRN
Status: DISCONTINUED | OUTPATIENT
Start: 2022-03-28 | End: 2022-03-28 | Stop reason: HOSPADM

## 2022-03-28 RX ORDER — SODIUM CHLORIDE, SODIUM LACTATE, POTASSIUM CHLORIDE, CALCIUM CHLORIDE 600; 310; 30; 20 MG/100ML; MG/100ML; MG/100ML; MG/100ML
INJECTION, SOLUTION INTRAVENOUS CONTINUOUS
Status: DISCONTINUED | OUTPATIENT
Start: 2022-03-28 | End: 2022-03-28 | Stop reason: HOSPADM

## 2022-03-28 RX ORDER — ONDANSETRON 2 MG/ML
INJECTION INTRAMUSCULAR; INTRAVENOUS PRN
Status: DISCONTINUED | OUTPATIENT
Start: 2022-03-28 | End: 2022-03-28 | Stop reason: SDUPTHER

## 2022-03-28 RX ORDER — SODIUM CHLORIDE 9 MG/ML
25 INJECTION, SOLUTION INTRAVENOUS PRN
Status: DISCONTINUED | OUTPATIENT
Start: 2022-03-28 | End: 2022-03-28 | Stop reason: HOSPADM

## 2022-03-28 RX ORDER — METOCLOPRAMIDE HYDROCHLORIDE 5 MG/ML
10 INJECTION INTRAMUSCULAR; INTRAVENOUS ONCE
Status: COMPLETED | OUTPATIENT
Start: 2022-03-28 | End: 2022-03-28

## 2022-03-28 RX ORDER — SODIUM CHLORIDE 0.9 % (FLUSH) 0.9 %
5-40 SYRINGE (ML) INJECTION EVERY 12 HOURS SCHEDULED
Status: DISCONTINUED | OUTPATIENT
Start: 2022-03-28 | End: 2022-03-28 | Stop reason: HOSPADM

## 2022-03-28 RX ORDER — ROCURONIUM BROMIDE 10 MG/ML
INJECTION, SOLUTION INTRAVENOUS PRN
Status: DISCONTINUED | OUTPATIENT
Start: 2022-03-28 | End: 2022-03-28 | Stop reason: SDUPTHER

## 2022-03-28 RX ORDER — SODIUM CHLORIDE 9 MG/ML
INJECTION, SOLUTION INTRAVENOUS PRN
Status: DISCONTINUED | OUTPATIENT
Start: 2022-03-28 | End: 2022-03-28 | Stop reason: HOSPADM

## 2022-03-28 RX ORDER — GABAPENTIN 300 MG/1
300 CAPSULE ORAL ONCE
Status: COMPLETED | OUTPATIENT
Start: 2022-03-28 | End: 2022-03-28

## 2022-03-28 RX ORDER — ATROPA BELLADONNA AND OPIUM 16.2; 3 MG/1; MG/1
SUPPOSITORY RECTAL PRN
Status: DISCONTINUED | OUTPATIENT
Start: 2022-03-28 | End: 2022-03-28 | Stop reason: SDUPTHER

## 2022-03-28 RX ORDER — LIDOCAINE HYDROCHLORIDE 20 MG/ML
INJECTION, SOLUTION EPIDURAL; INFILTRATION; INTRACAUDAL; PERINEURAL PRN
Status: DISCONTINUED | OUTPATIENT
Start: 2022-03-28 | End: 2022-03-28 | Stop reason: SDUPTHER

## 2022-03-28 RX ORDER — HYDROCODONE BITARTRATE AND ACETAMINOPHEN 5; 325 MG/1; MG/1
1 TABLET ORAL EVERY 6 HOURS PRN
Qty: 10 TABLET | Refills: 0 | Status: SHIPPED | OUTPATIENT
Start: 2022-03-28 | End: 2022-04-02

## 2022-03-28 RX ORDER — DEXAMETHASONE SODIUM PHOSPHATE 10 MG/ML
INJECTION, SOLUTION INTRAMUSCULAR; INTRAVENOUS PRN
Status: DISCONTINUED | OUTPATIENT
Start: 2022-03-28 | End: 2022-03-28 | Stop reason: SDUPTHER

## 2022-03-28 RX ORDER — FENTANYL CITRATE 50 UG/ML
50 INJECTION, SOLUTION INTRAMUSCULAR; INTRAVENOUS EVERY 5 MIN PRN
Status: DISCONTINUED | OUTPATIENT
Start: 2022-03-28 | End: 2022-03-28 | Stop reason: HOSPADM

## 2022-03-28 RX ORDER — DIMENHYDRINATE 50 MG/1
50 TABLET ORAL ONCE
Status: COMPLETED | OUTPATIENT
Start: 2022-03-28 | End: 2022-03-28

## 2022-03-28 RX ORDER — KETOROLAC TROMETHAMINE 30 MG/ML
INJECTION, SOLUTION INTRAMUSCULAR; INTRAVENOUS PRN
Status: DISCONTINUED | OUTPATIENT
Start: 2022-03-28 | End: 2022-03-28 | Stop reason: SDUPTHER

## 2022-03-28 RX ORDER — FENTANYL CITRATE 50 UG/ML
INJECTION, SOLUTION INTRAMUSCULAR; INTRAVENOUS PRN
Status: DISCONTINUED | OUTPATIENT
Start: 2022-03-28 | End: 2022-03-28 | Stop reason: SDUPTHER

## 2022-03-28 RX ORDER — DEXAMETHASONE SODIUM PHOSPHATE 4 MG/ML
INJECTION, SOLUTION INTRA-ARTICULAR; INTRALESIONAL; INTRAMUSCULAR; INTRAVENOUS; SOFT TISSUE PRN
Status: DISCONTINUED | OUTPATIENT
Start: 2022-03-28 | End: 2022-03-28 | Stop reason: SDUPTHER

## 2022-03-28 RX ORDER — HYDROCODONE BITARTRATE AND ACETAMINOPHEN 5; 325 MG/1; MG/1
1 TABLET ORAL EVERY 6 HOURS PRN
Status: DISCONTINUED | OUTPATIENT
Start: 2022-03-28 | End: 2022-03-28 | Stop reason: HOSPADM

## 2022-03-28 RX ORDER — MIDAZOLAM HYDROCHLORIDE 1 MG/ML
INJECTION INTRAMUSCULAR; INTRAVENOUS PRN
Status: DISCONTINUED | OUTPATIENT
Start: 2022-03-28 | End: 2022-03-28 | Stop reason: SDUPTHER

## 2022-03-28 RX ORDER — ONDANSETRON 2 MG/ML
4 INJECTION INTRAMUSCULAR; INTRAVENOUS ONCE
Status: COMPLETED | OUTPATIENT
Start: 2022-03-28 | End: 2022-03-28

## 2022-03-28 RX ORDER — ACETAMINOPHEN 325 MG/1
650 TABLET ORAL ONCE
Status: COMPLETED | OUTPATIENT
Start: 2022-03-28 | End: 2022-03-28

## 2022-03-28 RX ORDER — KETOROLAC TROMETHAMINE 10 MG/1
10 TABLET, FILM COATED ORAL EVERY 6 HOURS PRN
Qty: 20 TABLET | Refills: 0 | Status: SHIPPED | OUTPATIENT
Start: 2022-03-28 | End: 2022-07-26

## 2022-03-28 RX ORDER — PROPOFOL 10 MG/ML
INJECTION, EMULSION INTRAVENOUS PRN
Status: DISCONTINUED | OUTPATIENT
Start: 2022-03-28 | End: 2022-03-28 | Stop reason: SDUPTHER

## 2022-03-28 RX ADMIN — ONDANSETRON 4 MG: 2 INJECTION INTRAMUSCULAR; INTRAVENOUS at 10:46

## 2022-03-28 RX ADMIN — ROCURONIUM BROMIDE 15 MG: 10 SOLUTION INTRAVENOUS at 12:09

## 2022-03-28 RX ADMIN — LIDOCAINE HYDROCHLORIDE 40 MG: 20 INJECTION, SOLUTION EPIDURAL; INFILTRATION; INTRACAUDAL; PERINEURAL at 11:50

## 2022-03-28 RX ADMIN — FENTANYL CITRATE 50 MCG: 50 INJECTION INTRAMUSCULAR; INTRAVENOUS at 10:55

## 2022-03-28 RX ADMIN — SUGAMMADEX 180 MG: 100 INJECTION, SOLUTION INTRAVENOUS at 12:54

## 2022-03-28 RX ADMIN — HYDROCODONE BITARTRATE AND ACETAMINOPHEN 1 TABLET: 5; 325 TABLET ORAL at 14:08

## 2022-03-28 RX ADMIN — SODIUM CHLORIDE 20 ML: 9 INJECTION INTRAMUSCULAR; INTRAVENOUS; SUBCUTANEOUS at 11:00

## 2022-03-28 RX ADMIN — SODIUM CHLORIDE, POTASSIUM CHLORIDE, SODIUM LACTATE AND CALCIUM CHLORIDE: 600; 310; 30; 20 INJECTION, SOLUTION INTRAVENOUS at 10:39

## 2022-03-28 RX ADMIN — ROCURONIUM BROMIDE 35 MG: 10 SOLUTION INTRAVENOUS at 11:50

## 2022-03-28 RX ADMIN — FAMOTIDINE 20 MG: 10 INJECTION, SOLUTION INTRAVENOUS at 10:46

## 2022-03-28 RX ADMIN — KETOROLAC TROMETHAMINE 30 MG: 30 INJECTION, SOLUTION INTRAMUSCULAR at 12:51

## 2022-03-28 RX ADMIN — MIDAZOLAM 2 MG: 1 INJECTION INTRAMUSCULAR; INTRAVENOUS at 10:55

## 2022-03-28 RX ADMIN — FENTANYL CITRATE 50 MCG: 50 INJECTION, SOLUTION INTRAMUSCULAR; INTRAVENOUS at 13:31

## 2022-03-28 RX ADMIN — ACETAMINOPHEN 650 MG: 325 TABLET ORAL at 10:42

## 2022-03-28 RX ADMIN — DEXAMETHASONE SODIUM PHOSPHATE 10 MG: 10 INJECTION, SOLUTION INTRAMUSCULAR; INTRAVENOUS at 11:00

## 2022-03-28 RX ADMIN — FENTANYL CITRATE 50 MCG: 50 INJECTION INTRAMUSCULAR; INTRAVENOUS at 12:13

## 2022-03-28 RX ADMIN — DEXAMETHASONE SODIUM PHOSPHATE 4 MG: 4 INJECTION, SOLUTION INTRAMUSCULAR; INTRAVENOUS at 12:23

## 2022-03-28 RX ADMIN — ONDANSETRON 4 MG: 2 INJECTION INTRAMUSCULAR; INTRAVENOUS at 12:45

## 2022-03-28 RX ADMIN — ROPIVACAINE HYDROCHLORIDE 36 ML: 5 INJECTION, SOLUTION EPIDURAL; INFILTRATION; PERINEURAL at 11:00

## 2022-03-28 RX ADMIN — ATROPA BELLADONNA AND OPIUM 30 MG: 16.2; 3 SUPPOSITORY RECTAL at 11:57

## 2022-03-28 RX ADMIN — METOCLOPRAMIDE 10 MG: 5 INJECTION, SOLUTION INTRAMUSCULAR; INTRAVENOUS at 10:42

## 2022-03-28 RX ADMIN — PROPOFOL 160 MG: 10 INJECTION, EMULSION INTRAVENOUS at 11:50

## 2022-03-28 RX ADMIN — CEFAZOLIN 2000 MG: 10 INJECTION, POWDER, FOR SOLUTION INTRAVENOUS at 11:43

## 2022-03-28 RX ADMIN — DIMENHYDRINATE 50 MG: 50 TABLET ORAL at 10:42

## 2022-03-28 RX ADMIN — GABAPENTIN 300 MG: 300 CAPSULE ORAL at 10:42

## 2022-03-28 ASSESSMENT — PAIN DESCRIPTION - ORIENTATION: ORIENTATION: LOWER

## 2022-03-28 ASSESSMENT — PAIN SCALES - GENERAL
PAINLEVEL_OUTOF10: 4
PAINLEVEL_OUTOF10: 6
PAINLEVEL_OUTOF10: 8
PAINLEVEL_OUTOF10: 6
PAINLEVEL_OUTOF10: 8

## 2022-03-28 ASSESSMENT — PAIN - FUNCTIONAL ASSESSMENT: PAIN_FUNCTIONAL_ASSESSMENT: 0-10

## 2022-03-28 ASSESSMENT — PAIN DESCRIPTION - LOCATION
LOCATION: ABDOMEN
LOCATION: ABDOMEN

## 2022-03-28 ASSESSMENT — PAIN DESCRIPTION - PAIN TYPE
TYPE: SURGICAL PAIN
TYPE: SURGICAL PAIN

## 2022-03-28 ASSESSMENT — PAIN DESCRIPTION - DESCRIPTORS
DESCRIPTORS: CRAMPING
DESCRIPTORS: CRAMPING;DISCOMFORT

## 2022-03-28 NOTE — ANESTHESIA POSTPROCEDURE EVALUATION
Department of Anesthesiology  Postprocedure Note    Patient: Shiloh Keys  MRN: [id number]  YOB: 1985  Date of evaluation: 3/28/2022  Time:  3:22 PM     Procedure Summary     Date: 03/28/22 Room / Location: [address]    Anesthesia Start: 9099 Anesthesia Stop: 0379    Procedure: HYSTERECTOMY VAGINAL LAPAROSCOPIC ROBOTIC ASSISTED with bilateral salpingectomy (N/A ) Diagnosis: (ADENOMYOSIS PELVIC PAIN)    Surgeons: Cody Chapa DO Responsible Provider: MARGARETTE Haji CRNA    Anesthesia Type: general ASA Status: 2          Anesthesia Type: general    Jf Phase I: Jf Score: 10    Jf Phase II: Jf Score: 10    Last vitals: Reviewed and per EMR flowsheets.        Anesthesia Post Evaluation    Patient location during evaluation: PACU  Patient participation: complete - patient participated  Level of consciousness: awake and alert  Pain score: 4  Airway patency: patent  Nausea & Vomiting: no nausea and no vomiting  Complications: no  Cardiovascular status: blood pressure returned to baseline  Respiratory status: acceptable and room air  Hydration status: stable

## 2022-03-28 NOTE — PROGRESS NOTES
Patient up to bathroom upon entry into phase 2 at this time. Patient denies any issues with urination. Scant amount of bloody drainage noted. New pad and panties provided. Ambulated back to chair with steady gait.

## 2022-03-28 NOTE — ANESTHESIA PROCEDURE NOTES
Peripheral Block    Patient location during procedure: pre-op  Start time: 3/28/2022 10:55 AM  End time: 3/28/2022 11:00 AM  Staffing  Performed: resident/CRNA   Resident/CRNA: MARGARETTE Medrano CRNA  Preanesthetic Checklist  Completed: patient identified, IV checked, site marked, risks and benefits discussed, surgical consent, monitors and equipment checked, pre-op evaluation, timeout performed, anesthesia consent given, oxygen available and patient being monitored  Peripheral Block  Patient position: supine  Prep: ChloraPrep  Patient monitoring: continuous pulse ox and IV access  Block type: TAP and Rectus sheath  Laterality: bilateral  Injection technique: single-shot  Guidance: ultrasound guided  Local infiltration: ropivacaine and decadron (See MAR for details.)  Provider prep: mask and sterile gloves  Local infiltration: ropivacaine and decadron (See MAR for details.)  Needle  Needle type:  Other   Needle gauge: 22 G  Needle length: 8 cm  Needle localization: ultrasound guidanceOther needle type: pajunk  Assessment  Injection assessment: no paresthesia on injection, local visualized surrounding nerve on ultrasound and negative aspiration for heme  Paresthesia pain: none  Slow fractionated injection: yes  Hemodynamics: stable  Reason for block: post-op pain management and at surgeon's request

## 2022-03-28 NOTE — H&P
HISTORY AND PHYSICAL             Date: 3/28/2022        Patient Name: Erna Lucia     YOB: 1985      Age:  39 y.o. Chief Complaint   No chief complaint on file. History Obtained From   patient    History of Present Illness   Pt continues to have pelvic pain/cramping the day before and the 3 days of her cycle. Cycles aren't heavy since her ablation but now painful. Initially just tried nsaid and expectant management but now interested in definitive treatment. Past Medical History     Past Medical History:   Diagnosis Date    Anxiety     Arthritis     Asthma     allergy induced    Depression     Eczema         Past Surgical History     Past Surgical History:   Procedure Laterality Date    CHOLECYSTECTOMY      ENDOMETRIAL ABLATION      KNEE SURGERY Right     LEG SURGERY Left     15 surgical procedures.  TONSILLECTOMY          Medications Prior to Admission     Prior to Admission medications    Medication Sig Start Date End Date Taking? Authorizing Provider   Multiple Vitamin (MULTIVITAMIN ADULT) TABS Take by mouth   Yes Historical Provider, MD   rOPINIRole (REQUIP) 0.25 MG tablet Take 1 tablet by mouth 1 hour prior to bedtime. 2/7/22   MARGARETTE David - CNP   topiramate (TOPAMAX) 25 MG tablet TAKE 1 TABLET BY MOUTH TWICE DAILY 6/28/21   Historical Provider, MD   naproxen (NAPROSYN) 500 MG tablet Take 1 tablet by mouth 2 times daily as needed for Pain 7/20/21   Aryan Dudley DO   QUEtiapine (SEROQUEL) 50 MG tablet nightly as needed  4/22/21   Historical Provider, MD   sertraline (ZOLOFT) 100 MG tablet TAKE 1 TABLET BY MOUTH ONCE DAILY 6/3/21   Historical Provider, MD   ALPRAZolam Venus Nanci) 0.25 MG tablet Take 0.25 mg by mouth as needed. 12/14/20   Historical Provider, MD        Allergies   Patient has no known allergies.     Social History     Social History     Tobacco History     Smoking Status  Former Smoker    Smokeless Tobacco Use  Never Used          Alcohol History     Alcohol Use Status  Yes Comment  social          Drug Use     Drug Use Status  Never          Sexual Activity     Sexually Active  Yes Partners  Male Birth Control/Protection  Surgical                Family History     Family History   Problem Relation Age of Onset    Thyroid Disease Mother        Review of Systems   Review of Systems   Constitutional: Negative for chills, fatigue and fever. Genitourinary: Positive for pelvic pain. Negative for menstrual problem. Physical Exam   /74   Pulse 78   Temp 97.4 °F (36.3 °C) (Temporal)   Resp 16   Ht 5' 1\" (1.549 m)   Wt 134 lb 6.4 oz (61 kg)   LMP 01/15/2022   SpO2 96%   Breastfeeding No   BMI 25.39 kg/m²     Physical Exam  Constitutional:       Appearance: Normal appearance. HENT:      Head: Normocephalic and atraumatic. Eyes:      Extraocular Movements: Extraocular movements intact. Pupils: Pupils are equal, round, and reactive to light. Cardiovascular:      Rate and Rhythm: Normal rate. Pulmonary:      Effort: Pulmonary effort is normal.   Musculoskeletal:         General: Normal range of motion. Cervical back: Normal range of motion. Skin:     General: Skin is warm and dry. Neurological:      Mental Status: She is alert and oriented to person, place, and time. Psychiatric:         Mood and Affect: Mood normal.         Behavior: Behavior normal.         Thought Content: Thought content normal.         Judgment: Judgment normal.         Labs    No results found for this or any previous visit (from the past 24 hour(s)). Imaging/Diagnostics Last 24 Hours   No results found. Assessment    1. Pelvic pain in female  2.  Adenomyosis    Plan   1. RA TLH, poss BSO    Consultations Ordered:  None    Electronically signed by Ciara Bruno PA-C on 3/28/22 at 10:43 AM EDT

## 2022-03-28 NOTE — PROGRESS NOTES
Patient verbalizes readiness for discharge at this time. Discharge Criteria    Inpatients must meet Criteria 1 through 7. All other patients are either YES or N/A. If a NO is chosen then Anesthesia or Surgeon must be notified. 1.  Minimum 30 minutes after last dose of sedative medication, minimum 120 minutes after last dose of reversal agent. Yes      2. Systolic BP stable within 20 mmHg for 30 minutes & systolic BP between 90 & 714 or within 10 mmHg of baseline. Yes      3. Pulse between 60 and 100 or within 10 bpm of baseline. Yes      4. Spontaneous respiratory rate >/= 10 per minute. Yes      5. SaO2 >/= 95 or  >/= baseline. Yes      6. Able to cough and swallow or return to baseline function. Yes      7. Alert and oriented or return to baseline mental status. Yes      8. Demonstrates controlled, coordinated movements, ambulates with steady gait, or return to baseline activity function. Yes      9. Minimal or no pain or nausea, or at a level tolerable and acceptable to patient. Yes      10. Takes and retains oral fluids as allowed. Yes      11. Procedural / perioperative site stable. Minimal or no bleeding. Yes          12. If GI endoscopy procedure, minimal or no abdominal distention or passing flatus. N/A      13. Written discharge instructions and emergency telephone number provided. Yes      14. Accompanied by a responsible adult.     Yes

## 2022-03-28 NOTE — ANESTHESIA PRE PROCEDURE
Department of Anesthesiology  Preprocedure Note       Name:  Melvi Hernandez   Age:  39 y.o.  :  1985                                          MRN:  [id number]         Date:  3/28/2022      Surgeon: Roby Newberry):  Mary Ochoa DO    Procedure: Procedure(s): HYSTERECTOMY VAGINAL LAPAROSCOPIC ROBOTIC ASSISTED POSSIBLE BSO, POSSIBLE LAP COLOPEXY    Medications prior to admission:   Prior to Admission medications    Medication Sig Start Date End Date Taking? Authorizing Provider   Multiple Vitamin (MULTIVITAMIN ADULT) TABS Take by mouth   Yes Historical Provider, MD   rOPINIRole (REQUIP) 0.25 MG tablet Take 1 tablet by mouth 1 hour prior to bedtime. 22   MARGARETTE Barker - CNP   topiramate (TOPAMAX) 25 MG tablet TAKE 1 TABLET BY MOUTH TWICE DAILY 21   Historical Provider, MD   naproxen (NAPROSYN) 500 MG tablet Take 1 tablet by mouth 2 times daily as needed for Pain 21   Mary Ochoa DO   QUEtiapine (SEROQUEL) 50 MG tablet nightly as needed  21   Historical Provider, MD   sertraline (ZOLOFT) 100 MG tablet TAKE 1 TABLET BY MOUTH ONCE DAILY 6/3/21   Historical Provider, MD   ALPRAZolam Gayl Gash) 0.25 MG tablet Take 0.25 mg by mouth as needed.   20   Historical Provider, MD       Current medications:    Current Facility-Administered Medications   Medication Dose Route Frequency Provider Last Rate Last Admin    lactated ringers infusion   IntraVENous Continuous MARGARETTE Santamaria - CRNA 100 mL/hr at 22 1039 New Bag at 22 1039    sodium chloride flush 0.9 % injection 5-40 mL  5-40 mL IntraVENous 2 times per day Tyler Burr PA-C        sodium chloride flush 0.9 % injection 5-40 mL  5-40 mL IntraVENous PRN Tyler Burr PA-C        0.9 % sodium chloride infusion  25 mL IntraVENous PRN Tyler Burr PA-C        ceFAZolin (ANCEF) 2000 mg in dextrose 5 % 100 mL IVPB  2,000 mg IntraVENous On Call to Shahnaz Warner PA-C        enoxaparin (LOVENOX) injection 40 mg  40 mg SubCUTAneous Once Deepa Castañeda PA-C           Allergies:  No Known Allergies    Problem List:    Patient Active Problem List   Diagnosis Code    High triglycerides E78.1    Restless leg syndrome G25.81       Past Medical History:        Diagnosis Date    Anxiety     Arthritis     Asthma     allergy induced    Depression     Eczema        Past Surgical History:        Procedure Laterality Date    CHOLECYSTECTOMY      ENDOMETRIAL ABLATION      KNEE SURGERY Right     LEG SURGERY Left     15 surgical procedures.  TONSILLECTOMY         Social History:    Social History     Tobacco Use    Smoking status: Former Smoker    Smokeless tobacco: Never Used   Substance Use Topics    Alcohol use: Yes     Comment: social                                Counseling given: Not Answered      Vital Signs (Current):   Vitals:    01/25/22 1425 03/28/22 1033   BP:  117/74   Pulse:  78   Resp:  16   Temp:  36.3 °C (97.4 °F)   TempSrc:  Temporal   SpO2:  96%   Weight: 130 lb (59 kg) 134 lb 6.4 oz (61 kg)   Height: 5' 1\" (1.549 m) 5' 1\" (1.549 m)                                              BP Readings from Last 3 Encounters:   03/28/22 117/74   03/15/22 104/68   12/23/21 110/76       NPO Status: Time of last liquid consumption: 2330                        Time of last solid consumption: 1900                        Date of last liquid consumption: 03/27/22                        Date of last solid food consumption: 03/27/22    BMI:   Wt Readings from Last 3 Encounters:   03/28/22 134 lb 6.4 oz (61 kg)   03/15/22 134 lb (60.8 kg)   02/07/22 139 lb (63 kg)     Body mass index is 25.39 kg/m².     CBC:   Lab Results   Component Value Date    WBC 6.0 12/23/2021    RBC 4.58 12/23/2021    RBC 3.71 05/07/2012    HGB 14.4 12/23/2021    HCT 43.7 12/23/2021    MCV 95.4 12/23/2021    RDW 12.2 12/23/2021     12/23/2021     05/07/2012       CMP:   Lab Results   Component Value Date     12/23/2021    K 4.3 12/23/2021     12/23/2021    CO2 22 12/23/2021    BUN 16 12/23/2021    CREATININE 0.55 12/23/2021    GFRAA >60 12/23/2021    LABGLOM >60 12/23/2021    GLUCOSE 90 12/23/2021    CALCIUM 9.2 12/23/2021    AST 17 12/23/2021    ALT 14 12/23/2021       POC Tests: No results for input(s): POCGLU, POCNA, POCK, POCCL, POCBUN, POCHEMO, POCHCT in the last 72 hours. Coags: No results found for: PROTIME, INR, APTT    HCG (If Applicable): No results found for: PREGTESTUR, PREGSERUM, HCG, HCGQUANT     ABGs: No results found for: PHART, PO2ART, UZX2JVT, XLK2VFC, BEART, K2NWRDMH     Type & Screen (If Applicable):  No results found for: LABABO, LABRH    Drug/Infectious Status (If Applicable):  No results found for: HIV, HEPCAB    COVID-19 Screening (If Applicable): No results found for: COVID19        Anesthesia Evaluation  Patient summary reviewed and Nursing notes reviewed no history of anesthetic complications:   Airway: Mallampati: II  TM distance: >3 FB   Neck ROM: full  Mouth opening: > = 3 FB Dental: normal exam         Pulmonary:normal exam  breath sounds clear to auscultation  (+) asthma: allergic asthma,                            Cardiovascular:Negative CV ROS  Exercise tolerance: good (>4 METS),           Rhythm: regular  Rate: normal           Beta Blocker:  Not on Beta Blocker         Neuro/Psych:   (+) depression/anxiety             GI/Hepatic/Renal: Neg GI/Hepatic/Renal ROS            Endo/Other:    (+) : arthritis:., .                 Abdominal:             Vascular: negative vascular ROS. Other Findings:             Anesthesia Plan      general     ASA 2       Induction: intravenous. MIPS: Postoperative opioids intended and Prophylactic antiemetics administered. Anesthetic plan and risks discussed with patient. Plan discussed with CRNA.                   Gwen Salas, MARGARETTE - JURGEN   3/28/2022

## 2022-03-28 NOTE — OP NOTE
Preoperative diagnosis: 1. Chronic pelvic pain  2. Dysmenorrhea    Postoperative diagnosis: Same    Procedure:  Robotic-assisted Total Laparoscopic Hysterectomy with Bilateral Salpingectomy    Surgeon: Dr. Vaishali Browning    Asst.: Andre Fuchs PGY-2    Anesthesia: Gen. Estimated blood loss: 25 mL    Fluids: LR    Urine: 100 mL of clear urine    Condition: Stable    Complications: None    Findings: The patient had an anteverted uterus which sounded to 10 cm in depth. The tubes and ovaries were grossly within normal limits. Bilateral ureteral peristalsis was noted throughout the case and after closure of the vaginal cuff. Specimen removed: Uterus and Bilateral tubes    Operative note: The patient was taken to the operating room where general anesthesia was obtained without difficulty. She was prepped and draped usual sterile fashion in a dorsal lithotomy position. Timeout was performed. A weighted speculum was placed in the patient's vagina and the anterior lip of the cervix was grasped with a single-tooth tenaculum. The cervix was progressively dilated and the uterus was sounded with the findings noted above. A V care uterine manipulator was then placed. A Feliciano catheter was placed and left to gravity drainage. At this point attention was turned to the patient's abdomen where a supraumbilical incision was made with a scalpel. An 8 mm skin incision was made. A veress needle was then carefully introduced at a 45° angle while tenting the abdominal wall. Intraabdominal placement was confirmed by use of water-filled syringe and drop in intra-abdominal pressure with insufflation of CO2. Pneumoperitoneum was obtained with 2.5 liters of CO2. An 8 mm robotic port was then placed without difficulty and intra-abdominal placement was confirmed by laparoscopy.   Second and third ports were then placed under direct visualization approximately 4 cm inferior and  5 cm lateral to the first.  8 mm robotic ports were placed in these locations as well. A fourth and final port was placed lateral to the right. We placed a robotic port in this location. At this point the patient was placed into steep Trendelenburg position. The robot was brought into position and docked. The right arm was loaded with the scissors with monopolar cautery. The left side was loaded with the fenestrated bipolar. Attention was then turned to the console portion of the surgery. The left tube was tented and the mesosalpinx of the broad ligament was serially ligated and transected with the fenestrated bipolar toward the cornua of the uterus. The ovarian pedicle was then ligated with the fenestrated bipolar and transected with the scissors. Dissection continued along the broad ligament until the round ligament was ligated and transected. The anterior uterine serosa was then undermined and taken down to free the bladder from the lower uterine segment and cervix. This was done working from the left side to the midline. Attention was then turned to the right cornual region of the uterus and in a similar manner the tube and ovary were ligated and transected. Dissection again continued along the broad ligament until the round ligament was ligated and transected. The remainder of the bladder flap was then taken down. Once the bladder was freed from the lower uterine segment and cervix, the uterine arteries were skeletonized, ligated, and transected bilaterally. Using the scissors, the uterus was amputated just beneath the cervix using the groove of the V care as a guide. The uterus and tubes were then withdrawn through the vagina and a sponge stick was placed to maintain pneumoperitoneum. The vaginal cuff was then closed with V lock suture working from right to left using 2-0 suture. Copious amounts of irrigation were then used to evaluate the cuff and pedicles to assure hemostasis.   Bilateral ureteral peristalsis was again noted. At this point the instruments removed from the abdomen. The skin incisions were closed with 4-0 Monocryl in a subcuticular fashion. The uterus and bilateral tubes were handed off to pathology. The vaginal cuff was then examined with no evidence of bleeding. The Feliciano catheter was removed. Sponge, lap, needle, and instrument counts were correct ×2 and the patient was taken to the recovery area in apparently stable condition. Surgical Assistant documentation:    An assistant surgeon was required with this surgery. She was able to provide the necessary visualization, uterine manipulation, and suturing. Having her available allowed this case to be perfomed in a safe and timely manner.

## 2022-03-30 LAB — SURGICAL PATHOLOGY REPORT: NORMAL

## 2022-04-25 ENCOUNTER — OFFICE VISIT (OUTPATIENT)
Dept: OBGYN | Age: 37
End: 2022-04-25

## 2022-04-25 ENCOUNTER — HOSPITAL ENCOUNTER (OUTPATIENT)
Age: 37
Setting detail: SPECIMEN
Discharge: HOME | End: 2022-04-25
Payer: COMMERCIAL

## 2022-04-25 VITALS
HEIGHT: 61 IN | BODY MASS INDEX: 26.43 KG/M2 | DIASTOLIC BLOOD PRESSURE: 68 MMHG | SYSTOLIC BLOOD PRESSURE: 104 MMHG | WEIGHT: 140 LBS

## 2022-04-25 DIAGNOSIS — Z09 POSTOPERATIVE EXAMINATION: Primary | ICD-10-CM

## 2022-04-25 DIAGNOSIS — N76.0 ACUTE VAGINITIS: ICD-10-CM

## 2022-04-25 PROCEDURE — 99024 POSTOP FOLLOW-UP VISIT: CPT | Performed by: OBSTETRICS & GYNECOLOGY

## 2022-04-25 PROCEDURE — 87480 CANDIDA DNA DIR PROBE: CPT

## 2022-04-25 PROCEDURE — 87660 TRICHOMONAS VAGIN DIR PROBE: CPT

## 2022-04-25 PROCEDURE — 87510 GARDNER VAG DNA DIR PROBE: CPT

## 2022-04-25 NOTE — PROGRESS NOTES
Postop Progress Note    Kam Delatorre presents to the office for postop follow up. Chief Complaint   Patient presents with    Post-Op Check     Patient is being seen for post op after TLH/BS 3/28/2022. Patient notes off and on spotting that it pale pink in color. Objective    There were no vitals filed for this visit. General: alert, cooperative and no distress  Incision: healing well  Cuff well healed and intact    Assessment  Doing well postoperatively. Plan  1. Continue any current medications  2. Wound care discussed  3. Pt is to increase activities as tolerated - no heavy liftting or exercise until 6 weeks post op; no intercourse until 8 weeks post op  4. Usual diet  5. Follow up: annual    Electronically signed by Diamond Benitez DO on 4/25/2022 at 4:45 PM   Postop Progress Note    Kam Delatorre presents to the office for postop follow up. Objective    Vitals:    04/25/22 1647   BP: 104/68     General: alert, cooperative and no distress  Incision: healing well    Assessment  Doing well postoperatively. Plan  1. Continue any current medications  2. Wound care discussed  3. Pt is to increase activities as tolerated  4. Usual diet  5.  Follow up: as needed    Electronically signed by Diamond Benitez DO on 4/25/2022 at 4:55 PM

## 2022-04-26 LAB
CANDIDA SPECIES, DNA PROBE: POSITIVE
GARDNERELLA VAGINALIS, DNA PROBE: POSITIVE
SOURCE: ABNORMAL
TRICHOMONAS VAGINALIS DNA: NEGATIVE

## 2022-04-27 RX ORDER — FLUCONAZOLE 150 MG/1
150 TABLET ORAL
Qty: 2 TABLET | Refills: 0 | Status: SHIPPED | OUTPATIENT
Start: 2022-04-27 | End: 2022-05-03

## 2022-04-27 RX ORDER — METRONIDAZOLE 500 MG/1
500 TABLET ORAL 2 TIMES DAILY
Qty: 20 TABLET | Refills: 0 | Status: SHIPPED | OUTPATIENT
Start: 2022-04-27 | End: 2022-05-07

## 2022-05-20 ENCOUNTER — HOSPITAL ENCOUNTER (OUTPATIENT)
Age: 37
Discharge: HOME | End: 2022-05-20
Payer: COMMERCIAL

## 2022-05-20 LAB
ABSOLUTE EOS #: 0.28 K/UL (ref 0–0.44)
ABSOLUTE IMMATURE GRANULOCYTE: 0.03 K/UL (ref 0–0.3)
ABSOLUTE LYMPH #: 2.81 K/UL (ref 1.1–3.7)
ABSOLUTE MONO #: 0.66 K/UL (ref 0.1–1.2)
ANION GAP SERPL CALCULATED.3IONS-SCNC: 11 MMOL/L (ref 9–17)
BASOPHILS # BLD: 1 % (ref 0–2)
BASOPHILS ABSOLUTE: 0.05 K/UL (ref 0–0.2)
BUN BLDV-MCNC: 14 MG/DL (ref 6–20)
BUN/CREAT BLD: 30 (ref 9–20)
CALCIUM SERPL-MCNC: 9.2 MG/DL (ref 8.6–10.4)
CHLORIDE BLD-SCNC: 103 MMOL/L (ref 98–107)
CO2: 24 MMOL/L (ref 20–31)
CREAT SERPL-MCNC: 0.46 MG/DL (ref 0.5–0.9)
EKG ATRIAL RATE: 72 BPM
EKG P AXIS: 65 DEGREES
EKG P-R INTERVAL: 136 MS
EKG Q-T INTERVAL: 422 MS
EKG QRS DURATION: 86 MS
EKG QTC CALCULATION (BAZETT): 462 MS
EKG R AXIS: 57 DEGREES
EKG T AXIS: 45 DEGREES
EKG VENTRICULAR RATE: 72 BPM
EOSINOPHILS RELATIVE PERCENT: 3 % (ref 1–4)
GFR AFRICAN AMERICAN: >60 ML/MIN
GFR NON-AFRICAN AMERICAN: >60 ML/MIN
GFR SERPL CREATININE-BSD FRML MDRD: ABNORMAL ML/MIN/{1.73_M2}
GFR SERPL CREATININE-BSD FRML MDRD: ABNORMAL ML/MIN/{1.73_M2}
GLUCOSE BLD-MCNC: 79 MG/DL (ref 70–99)
HCT VFR BLD CALC: 40.8 % (ref 36.3–47.1)
HEMOGLOBIN: 13.8 G/DL (ref 11.9–15.1)
IMMATURE GRANULOCYTES: 0 %
LYMPHOCYTES # BLD: 33 % (ref 24–43)
MCH RBC QN AUTO: 31.7 PG (ref 25.2–33.5)
MCHC RBC AUTO-ENTMCNC: 33.8 G/DL (ref 28.4–34.8)
MCV RBC AUTO: 93.6 FL (ref 82.6–102.9)
MONOCYTES # BLD: 8 % (ref 3–12)
NRBC AUTOMATED: 0 PER 100 WBC
PDW BLD-RTO: 12.7 % (ref 11.8–14.4)
PLATELET # BLD: 342 K/UL (ref 138–453)
PMV BLD AUTO: 10.2 FL (ref 8.1–13.5)
POTASSIUM SERPL-SCNC: 3.8 MMOL/L (ref 3.7–5.3)
RBC # BLD: 4.36 M/UL (ref 3.95–5.11)
SEG NEUTROPHILS: 55 % (ref 36–65)
SEGMENTED NEUTROPHILS ABSOLUTE COUNT: 4.77 K/UL (ref 1.5–8.1)
SODIUM BLD-SCNC: 138 MMOL/L (ref 135–144)
WBC # BLD: 8.6 K/UL (ref 3.5–11.3)

## 2022-05-20 PROCEDURE — 93010 ELECTROCARDIOGRAM REPORT: CPT | Performed by: FAMILY MEDICINE

## 2022-05-20 PROCEDURE — 85025 COMPLETE CBC W/AUTO DIFF WBC: CPT

## 2022-05-20 PROCEDURE — 93005 ELECTROCARDIOGRAM TRACING: CPT | Performed by: PODIATRIST

## 2022-05-20 PROCEDURE — 80048 BASIC METABOLIC PNL TOTAL CA: CPT

## 2022-05-20 PROCEDURE — 36415 COLL VENOUS BLD VENIPUNCTURE: CPT

## 2022-07-26 ENCOUNTER — OFFICE VISIT (OUTPATIENT)
Dept: OBGYN | Age: 37
End: 2022-07-26
Payer: COMMERCIAL

## 2022-07-26 VITALS
DIASTOLIC BLOOD PRESSURE: 64 MMHG | SYSTOLIC BLOOD PRESSURE: 126 MMHG | BODY MASS INDEX: 26.58 KG/M2 | WEIGHT: 140.8 LBS | HEIGHT: 61 IN

## 2022-07-26 DIAGNOSIS — Z01.419 WOMEN'S ANNUAL ROUTINE GYNECOLOGICAL EXAMINATION: Primary | ICD-10-CM

## 2022-07-26 PROCEDURE — 99395 PREV VISIT EST AGE 18-39: CPT | Performed by: OBSTETRICS & GYNECOLOGY

## 2022-07-26 RX ORDER — CEFADROXIL 500 MG/1
CAPSULE ORAL
COMMUNITY
Start: 2022-07-20

## 2022-07-26 ASSESSMENT — ENCOUNTER SYMPTOMS
CONSTIPATION: 0
DIARRHEA: 0
SHORTNESS OF BREATH: 0
ABDOMINAL PAIN: 0

## 2022-07-26 NOTE — PROGRESS NOTES
Negative for dysuria, enuresis, frequency, menstrual problem, pelvic pain, urgency and vaginal bleeding. Spotting after intercourse x 1 episode   Neurological:  Negative for dizziness, light-headedness and headaches. Objective  Physical Exam  Constitutional:       Appearance: Normal appearance. Genitourinary:      Vulva, bladder and urethral meatus normal.      Right Labia: No rash or lesions. Left Labia: No lesions or rash. No labial fusion noted. No vaginal discharge. No vaginal prolapse present. No vaginal atrophy present. Right Adnexa: not tender and no mass present. Left Adnexa: not tender and no mass present. Cervix is absent. No parametrium nodularity present. Uterus is absent. Breasts:     Breasts are soft. Right: No inverted nipple, mass, nipple discharge, skin change or tenderness. Left: No inverted nipple, mass, nipple discharge, skin change or tenderness. HENT:      Head: Normocephalic and atraumatic. Eyes:      Extraocular Movements: Extraocular movements intact. Pupils: Pupils are equal, round, and reactive to light. Cardiovascular:      Rate and Rhythm: Normal rate. Pulmonary:      Effort: Pulmonary effort is normal.   Abdominal:      General: There is no distension. Palpations: Abdomen is soft. There is no mass. Tenderness: There is no abdominal tenderness. Musculoskeletal:         General: Normal range of motion. Cervical back: Normal range of motion. Neurological:      General: No focal deficit present. Mental Status: She is alert and oriented to person, place, and time. Skin:     General: Skin is warm and dry. Psychiatric:         Mood and Affect: Mood normal.         Behavior: Behavior normal.         Thought Content: Thought content normal.         Judgment: Judgment normal.                               Assessment and Plan          Diagnosis Orders   1.  Women's annual routine gynecological examination                  I am having Max Nieves. Shirin maintain her ALPRAZolam, QUEtiapine, sertraline, topiramate, Multivitamin Adult, rOPINIRole, and cefadroxil. Return in about 1 year (around 7/26/2023) for annual.    She was also counseled on her preventative health maintenance recommendations and follow-up. There are no Patient Instructions on file for this visit.     Michelle Prater, RACQUEL,5/46/6968 2:42 PM

## 2022-07-27 RX ORDER — TRIAMCINOLONE ACETONIDE 1 MG/G
CREAM TOPICAL
Qty: 30 G | Refills: 0 | OUTPATIENT
Start: 2022-07-27

## 2022-08-18 RX ORDER — ROPINIROLE 0.25 MG/1
TABLET, FILM COATED ORAL
Qty: 90 TABLET | Refills: 3 | Status: SHIPPED | OUTPATIENT
Start: 2022-08-18

## 2022-11-11 ENCOUNTER — HOSPITAL ENCOUNTER (OUTPATIENT)
Dept: PHYSICAL THERAPY | Age: 37
Setting detail: THERAPIES SERIES
Discharge: HOME | End: 2022-11-11
Payer: COMMERCIAL

## 2022-11-11 PROCEDURE — L3020 FOOT LONGITUD/METATARSAL SUP: HCPCS

## 2022-11-11 NOTE — PLAN OF CARE
Phone: [phone]        Fax: [phone]  [facility]  Physical Therapy  Orthotic Fitting Plan of Care  Date: 2022    Patient Name: Claudene Huge          : 1985  (39 y.o.)  [facility] #: [id number]    Referring Physician: Memo Hoffmann PA-C     Diagnosis: Localized swelling of L lower leg, R22.42, history of ankle fusion, Z98.1, deformity of L ankle joint, M21.962    Reason for Referral:  Patient reports a previous history of an ankle fusion with screws that was removed and a fusion with a plate was placed on 64. She reports she has been walking on her foot with an ankle brace for the past couple of weeks. Objective Assessment:  Patient with valgus rearfoot, and decreased arch of L foot. She stands with L heel elevated from ground. 1 cm heel lift used to contact floor in standing. She would benefit from custom orthotics to improve gait mechanics and arch support. Treatment:  [x]  Fitting for custom orthotics  [x]  Gait and foot analysis  []   Other:     Instructed Patient:   [x]  Proper fitting and follow up visit. [] Other:        Treatment Goals:   [x]  Met     []  Not Met              2022   [x]  Patient will be fitted for custom orthotics to be issued at a later date. Treatment Plan:   [x]  Assessment for and fitting of custom orthotics. Rehab Potential: []  Poor   []  Fair  [x]  Good   []  Excellent     If there are any questions regarding plan of care, please do not hesitate to contact the center. Thank you for your referral.      Therapists Signature: Brent Wayne PT, DPT    Date: 2022        To be completed by the referring physicians   By signing below, I agree to the above treatment plan.       Physicians Signature:                        Date: 2022

## 2022-12-08 ENCOUNTER — HOSPITAL ENCOUNTER (OUTPATIENT)
Dept: PHYSICAL THERAPY | Age: 37
Setting detail: THERAPIES SERIES
Discharge: HOME | End: 2022-12-08
Payer: COMMERCIAL

## 2022-12-08 PROCEDURE — 97760 ORTHOTIC MGMT&TRAING 1ST ENC: CPT

## 2022-12-08 NOTE — DISCHARGE SUMMARY
Phone: [phone]        Fax: [phone]                     Outpatient Physical Therapy           Orthotic Discharge                    Date: 2022             ACCT #: [de-identified]                          Patient: Claudene Huge  : 1985        [x]   Patient received custom foot orthotics this date with proper fitting noted. Discussed appropriate wearing schedule and care for orthotics with good pt understanding noted. We will now discharge PT.      Brent Wayne, PT, DPT     Date: 2022, 10:23 AM

## 2022-12-27 ENCOUNTER — OFFICE VISIT (OUTPATIENT)
Dept: PRIMARY CARE CLINIC | Age: 37
End: 2022-12-27
Payer: COMMERCIAL

## 2022-12-27 VITALS
TEMPERATURE: 97 F | SYSTOLIC BLOOD PRESSURE: 102 MMHG | WEIGHT: 142 LBS | DIASTOLIC BLOOD PRESSURE: 70 MMHG | HEIGHT: 61 IN | BODY MASS INDEX: 26.81 KG/M2 | HEART RATE: 100 BPM | OXYGEN SATURATION: 100 % | RESPIRATION RATE: 18 BRPM

## 2022-12-27 DIAGNOSIS — Z80.3 FAMILY HISTORY OF BREAST CANCER IN SISTER: ICD-10-CM

## 2022-12-27 DIAGNOSIS — L30.9 ECZEMA, UNSPECIFIED TYPE: ICD-10-CM

## 2022-12-27 DIAGNOSIS — Z12.31 BREAST CANCER SCREENING BY MAMMOGRAM: ICD-10-CM

## 2022-12-27 DIAGNOSIS — Z00.00 WELLNESS EXAMINATION: Primary | ICD-10-CM

## 2022-12-27 DIAGNOSIS — R49.0 HOARSENESS OF VOICE: ICD-10-CM

## 2022-12-27 PROCEDURE — G8419 CALC BMI OUT NRM PARAM NOF/U: HCPCS | Performed by: NURSE PRACTITIONER

## 2022-12-27 PROCEDURE — 90674 CCIIV4 VAC NO PRSV 0.5 ML IM: CPT | Performed by: NURSE PRACTITIONER

## 2022-12-27 PROCEDURE — 99213 OFFICE O/P EST LOW 20 MIN: CPT | Performed by: NURSE PRACTITIONER

## 2022-12-27 PROCEDURE — 90471 IMMUNIZATION ADMIN: CPT | Performed by: NURSE PRACTITIONER

## 2022-12-27 PROCEDURE — 99395 PREV VISIT EST AGE 18-39: CPT | Performed by: NURSE PRACTITIONER

## 2022-12-27 PROCEDURE — G8482 FLU IMMUNIZE ORDER/ADMIN: HCPCS | Performed by: NURSE PRACTITIONER

## 2022-12-27 PROCEDURE — 1036F TOBACCO NON-USER: CPT | Performed by: NURSE PRACTITIONER

## 2022-12-27 PROCEDURE — G8427 DOCREV CUR MEDS BY ELIG CLIN: HCPCS | Performed by: NURSE PRACTITIONER

## 2022-12-27 RX ORDER — MOMETASONE FUROATE 1 MG/G
CREAM TOPICAL
Qty: 1 EACH | Refills: 1 | Status: SHIPPED | OUTPATIENT
Start: 2022-12-27

## 2022-12-27 SDOH — ECONOMIC STABILITY: FOOD INSECURITY: WITHIN THE PAST 12 MONTHS, THE FOOD YOU BOUGHT JUST DIDN'T LAST AND YOU DIDN'T HAVE MONEY TO GET MORE.: NEVER TRUE

## 2022-12-27 SDOH — ECONOMIC STABILITY: FOOD INSECURITY: WITHIN THE PAST 12 MONTHS, YOU WORRIED THAT YOUR FOOD WOULD RUN OUT BEFORE YOU GOT MONEY TO BUY MORE.: NEVER TRUE

## 2022-12-27 ASSESSMENT — PATIENT HEALTH QUESTIONNAIRE - PHQ9
SUM OF ALL RESPONSES TO PHQ9 QUESTIONS 1 & 2: 0
SUM OF ALL RESPONSES TO PHQ QUESTIONS 1-9: 0
2. FEELING DOWN, DEPRESSED OR HOPELESS: 0
SUM OF ALL RESPONSES TO PHQ QUESTIONS 1-9: 0
1. LITTLE INTEREST OR PLEASURE IN DOING THINGS: 0

## 2022-12-27 ASSESSMENT — ENCOUNTER SYMPTOMS
CONSTIPATION: 0
SINUS PAIN: 0
SORE THROAT: 0
ABDOMINAL PAIN: 0
SHORTNESS OF BREATH: 0
WHEEZING: 0
RHINORRHEA: 0
DIARRHEA: 0
COUGH: 1
SINUS PRESSURE: 0

## 2022-12-27 ASSESSMENT — SOCIAL DETERMINANTS OF HEALTH (SDOH): HOW HARD IS IT FOR YOU TO PAY FOR THE VERY BASICS LIKE FOOD, HOUSING, MEDICAL CARE, AND HEATING?: NOT HARD AT ALL

## 2022-12-27 NOTE — PATIENT INSTRUCTIONS
SURVEY:    You may be receiving a survey from [facility] regarding your visit today. Please complete the survey to enable us to provide the highest quality of care to you and your family. If you cannot score us a very good on any question, please call the office to discuss how we could of made your experience a very good one. Thank you.

## 2022-12-27 NOTE — PROGRESS NOTES
Name: Morgan Runner  : 1985         Chief Complaint:     Chief Complaint   Patient presents with    Annual Exam     Wellness. History of Present Illness: Morgan Runner is a 44 y.o.  female who presents with Annual Exam (Wellness. )      HPI    Wellness: The patient presents for wellness exam. She admits well balanced diet. For exercise she notes physical therapy twice weekly. She underwent foot surgery with Dr. Cruz Lopez summer . She admits routine eye exams. She admits routine dental exams. She is vaccinated for covid. Most recent flu vaccine . Most recent tetanus vaccine 2018. She admits family history of breast cancer, maternal aunt and sister (diagnosed at age 37). She admits family history of colorectal cancer, maternal grandfather (age unknown). She is s/p hysterectomy 3/2022, cervix removed. RLS:  Current treatment includes requip 0.25mg QHS. She admits improvement in her sleep on this medication. Overall, she sleeps well, however she does have several nights with restless legs. She will use heating pad on her legs as well as get up to walk around with benefit.     Past Medical History:     Past Medical History:   Diagnosis Date    Anxiety     Arthritis     Asthma     allergy induced    Depression     Eczema       Reviewed all health maintenance requirements and ordered appropriate tests  Health Maintenance Due   Topic Date Due    HIV screen  Never done    Hepatitis C screen  Never done    COVID-19 Vaccine (4 - Booster for "[employer] series) 2022       Past Surgical History:     Past Surgical History:   Procedure Laterality Date    CHOLECYSTECTOMY      ENDOMETRIAL ABLATION      HYSTERECTOMY (CERVIX STATUS UNKNOWN)  2022    VAGINAL LAPAROSCOPIC ROBOTIC ASSISTED with bilateral salpingectomy     HYSTERECTOMY, VAGINAL N/A 3/28/2022    HYSTERECTOMY VAGINAL LAPAROSCOPIC ROBOTIC ASSISTED with bilateral salpingectomy performed by Myke Tovar DO at [address] [facility] SURGERY Right     LEG SURGERY Left     15 surgical procedures. TONSILLECTOMY          Medications:       Prior to Admission medications    Medication Sig Start Date End Date Taking? Authorizing Provider   mometasone (ELOCON) 0.1 % cream Apply topically once daily to affected areas. Do not use for longer than 2 weeks at a time. 12/27/22  Yes MARGARETTE Renteria CNP   rOPINIRole (REQUIP) 0.25 MG tablet TAKE 1 TABLET BY MOUTH EVERY DAY 1 HOUR PRIOR TO  BEDTIME 8/18/22  Yes MARGARETTE Renteria CNP   Multiple Vitamin (MULTIVITAMIN ADULT) TABS Take by mouth   Yes Historical Provider, MD   topiramate (TOPAMAX) 25 MG tablet TAKE 1 TABLET BY MOUTH TWICE DAILY 6/28/21  Yes Historical Provider, MD   QUEtiapine (SEROQUEL) 50 MG tablet nightly as needed  4/22/21  Yes Historical Provider, MD   sertraline (ZOLOFT) 100 MG tablet TAKE 1 TABLET BY MOUTH ONCE DAILY 6/3/21  Yes Historical Provider, MD   ALPRAZolam (XANAX) 0.25 MG tablet Take 0.25 mg by mouth as needed. 12/14/20  Yes Historical Provider, MD        Allergies:       Patient has no known allergies. Social History:     Tobacco:    reports that she has quit smoking. She has never used smokeless tobacco.  Alcohol:      reports current alcohol use. Drug Use:  reports no history of drug use. Family History:     Family History   Problem Relation Age of Onset    Thyroid Disease Mother        Review of Systems:     Positive and Negative as described in HPI    Review of Systems   Constitutional:  Negative for chills, fatigue, fever and unexpected weight change. HENT:  Negative for congestion, rhinorrhea, sinus pressure, sinus pain and sore throat (admits hoarse voice x1 day, improving. Denies sore throat). Eyes:  Negative for visual disturbance. Respiratory:  Positive for cough (mild at night and in the morning, caused by allergies. she takes claritin with benefit). Negative for shortness of breath and wheezing.     Cardiovascular:  Negative for chest pain and palpitations. Gastrointestinal:  Negative for abdominal pain, constipation and diarrhea. Genitourinary:  Negative for difficulty urinating. Musculoskeletal:  Negative for arthralgias. Skin:         Admits concern with eczema on face as well as left arm. She uses OTC cream as well as skin barrier cream with some benefit, though symptoms persist   Neurological:  Negative for dizziness, light-headedness and headaches. Physical Exam:   Vitals:  /70   Pulse 100   Temp 97 °F (36.1 °C)   Resp 18   Ht 5' 1\" (1.549 m)   Wt 142 lb (64.4 kg)   LMP 03/14/2022   SpO2 100%   BMI 26.83 kg/m²     Physical Exam  Constitutional:       General: She is not in acute distress. Appearance: Normal appearance. She is normal weight. She is not ill-appearing or toxic-appearing. HENT:      Right Ear: Tympanic membrane, ear canal and external ear normal. There is no impacted cerumen. Left Ear: Tympanic membrane, ear canal and external ear normal. There is no impacted cerumen. Nose: Nose normal. No congestion or rhinorrhea. Mouth/Throat:      Mouth: Mucous membranes are moist.      Pharynx: No oropharyngeal exudate or posterior oropharyngeal erythema. Cardiovascular:      Rate and Rhythm: Normal rate and regular rhythm. Heart sounds: Normal heart sounds. No murmur heard. Pulmonary:      Effort: Pulmonary effort is normal. No respiratory distress. Breath sounds: Normal breath sounds. No stridor. No wheezing, rhonchi or rales. Abdominal:      General: Abdomen is flat. Bowel sounds are normal. There is no distension. Palpations: Abdomen is soft. There is no mass. Tenderness: There is no abdominal tenderness. There is no guarding. Hernia: No hernia is present. Lymphadenopathy:      Cervical: No cervical adenopathy. Skin:     Comments: Erythematous patches noted left arm   Neurological:      Mental Status: She is alert and oriented to person, place, and time. Psychiatric:         Mood and Affect: Mood normal.         Behavior: Behavior normal.         Thought Content: Thought content normal.         Judgment: Judgment normal.       Data:     Lab Results   Component Value Date/Time     05/20/2022 04:16 PM    K 3.8 05/20/2022 04:16 PM     05/20/2022 04:16 PM    CO2 24 05/20/2022 04:16 PM    BUN 14 05/20/2022 04:16 PM    CREATININE 0.46 05/20/2022 04:16 PM    GLUCOSE 79 05/20/2022 04:16 PM    AST 17 12/23/2021 11:05 AM    ALT 14 12/23/2021 11:05 AM     Lab Results   Component Value Date/Time    WBC 8.6 05/20/2022 04:16 PM    RBC 4.36 05/20/2022 04:16 PM    RBC 3.71 05/07/2012 06:50 PM    HGB 13.8 05/20/2022 04:16 PM    HCT 40.8 05/20/2022 04:16 PM    MCV 93.6 05/20/2022 04:16 PM    MCH 31.7 05/20/2022 04:16 PM    MCHC 33.8 05/20/2022 04:16 PM    RDW 12.7 05/20/2022 04:16 PM     05/20/2022 04:16 PM     05/07/2012 06:50 PM    MPV 10.2 05/20/2022 04:16 PM     Lab Results   Component Value Date/Time    TSH 0.66 12/23/2021 11:05 AM     Lab Results   Component Value Date/Time    CHOL 196 12/23/2021 11:05 AM    HDL 56 12/23/2021 11:05 AM    LABA1C 4.4 12/30/2020 11:06 AM       Assessment/Plan:      Diagnosis Orders   1. Wellness examination  CBC    Comprehensive Metabolic Panel    Lipid Panel      2. Breast cancer screening by mammogram  RENETTA DIGITAL SCREEN W OR WO CAD BILATERAL      3. Hoarseness of voice        4. Eczema, unspecified type        5. Family history of breast cancer in sister            Wellness:  - Counseled on well-balanced diet and routine physical activity  - Encouraged routine eye and dental exams  - Flu vaccine administered today in office  - UTD COVID vaccinations and tetanus vaccine  - Wellness labs ordered today for completion  - Patient's sister was recently diagnosed with breast cancer (age 37). Order for mammogram placed today. I also discussed information regarding [facility] high risk breast cancer clinic.   I offer referral, patient would like to review information first.  This was supplied to patient    Hoarse Voice:  - Encourage warm tea with honey, salt water gargles, humidifier use, throat lozenges  - Notify office if symptoms worsen or persist    Allergic Rhinitis:  - Encourage oral antihistamine and nasal steroid daily  - Notify office if symptoms worsen or persist    Eczema:  - Counseled on lukewarm baths/showers  - Emollient use such as CeraVe, Cetaphil, Aquaphor  - Rx Elocon cream.  May use on face, though no longer than 2 weeks at a time. Avoid use near eyes  - Notify office if symptoms worsen or persist      Completed Refills   Requested Prescriptions     Signed Prescriptions Disp Refills    mometasone (ELOCON) 0.1 % cream 1 each 1     Sig: Apply topically once daily to affected areas. Do not use for longer than 2 weeks at a time. Orders Placed This Encounter   Procedures    RENETTA DIGITAL SCREEN W OR WO CAD BILATERAL     Standing Status:   Future     Standing Expiration Date:   2/27/2024    Influenza, FLUCELVAX, (age 10 mo+), IM, Preservative Free, 0.5 mL    CBC     Standing Status:   Future     Standing Expiration Date:   12/27/2023    Comprehensive Metabolic Panel     Standing Status:   Future     Standing Expiration Date:   12/27/2023    Lipid Panel     Standing Status:   Future     Standing Expiration Date:   12/27/2023          No results found for this visit on 12/27/22. Return in about 1 year (around 12/27/2023), or if symptoms worsen or fail to improve, for wellness.     Electronically signed by MARGARETTE Gonzalez CNP on 12/28/22 at 6:54 AM.

## 2022-12-28 PROBLEM — Z80.3 FAMILY HISTORY OF BREAST CANCER IN SISTER: Status: ACTIVE | Noted: 2022-12-28

## 2022-12-28 PROBLEM — L30.9 ECZEMA: Status: ACTIVE | Noted: 2022-12-28

## 2023-02-08 ENCOUNTER — HOSPITAL ENCOUNTER (OUTPATIENT)
Age: 38
Discharge: HOME | End: 2023-02-08
Payer: COMMERCIAL

## 2023-02-08 ENCOUNTER — HOSPITAL ENCOUNTER (OUTPATIENT)
Dept: WOMENS IMAGING | Age: 38
Discharge: HOME | End: 2023-02-10
Payer: COMMERCIAL

## 2023-02-08 DIAGNOSIS — Z00.00 WELLNESS EXAMINATION: ICD-10-CM

## 2023-02-08 DIAGNOSIS — Z12.31 BREAST CANCER SCREENING BY MAMMOGRAM: ICD-10-CM

## 2023-02-08 LAB
ALBUMIN SERPL-MCNC: 4.5 G/DL (ref 3.5–5.2)
ALBUMIN/GLOBULIN RATIO: 1.7 (ref 1–2.5)
ALP SERPL-CCNC: 72 U/L (ref 35–104)
ALT SERPL-CCNC: 18 U/L (ref 5–33)
ANION GAP SERPL CALCULATED.3IONS-SCNC: 12 MMOL/L (ref 9–17)
AST SERPL-CCNC: 17 U/L
BILIRUB SERPL-MCNC: 0.3 MG/DL (ref 0.3–1.2)
BUN SERPL-MCNC: 20 MG/DL (ref 6–20)
BUN/CREAT BLD: 43 (ref 9–20)
CALCIUM SERPL-MCNC: 9.4 MG/DL (ref 8.6–10.4)
CHLORIDE SERPL-SCNC: 104 MMOL/L (ref 98–107)
CHOLEST SERPL-MCNC: 195 MG/DL
CHOLESTEROL/HDL RATIO: 4.1
CO2 SERPL-SCNC: 23 MMOL/L (ref 20–31)
CREAT SERPL-MCNC: 0.47 MG/DL (ref 0.5–0.9)
GFR SERPL CREATININE-BSD FRML MDRD: >60 ML/MIN/1.73M2
GLUCOSE SERPL-MCNC: 82 MG/DL (ref 70–99)
HCT VFR BLD AUTO: 41.4 % (ref 36.3–47.1)
HDLC SERPL-MCNC: 47 MG/DL
HGB BLD-MCNC: 13.7 G/DL (ref 11.9–15.1)
LDLC SERPL CALC-MCNC: 112 MG/DL (ref 0–130)
MCH RBC QN AUTO: 31.2 PG (ref 25.2–33.5)
MCHC RBC AUTO-ENTMCNC: 33.1 G/DL (ref 28.4–34.8)
MCV RBC AUTO: 94.3 FL (ref 82.6–102.9)
NRBC AUTOMATED: 0 PER 100 WBC
PDW BLD-RTO: 13.2 % (ref 11.8–14.4)
PLATELET # BLD AUTO: 325 K/UL (ref 138–453)
PMV BLD AUTO: 10 FL (ref 8.1–13.5)
POTASSIUM SERPL-SCNC: 3.8 MMOL/L (ref 3.7–5.3)
PROT SERPL-MCNC: 7.2 G/DL (ref 6.4–8.3)
RBC # BLD: 4.39 M/UL (ref 3.95–5.11)
SODIUM SERPL-SCNC: 139 MMOL/L (ref 135–144)
TRIGL SERPL-MCNC: 180 MG/DL
WBC # BLD AUTO: 6.6 K/UL (ref 3.5–11.3)

## 2023-02-08 PROCEDURE — 80053 COMPREHEN METABOLIC PANEL: CPT

## 2023-02-08 PROCEDURE — 36415 COLL VENOUS BLD VENIPUNCTURE: CPT

## 2023-02-08 PROCEDURE — 85027 COMPLETE CBC AUTOMATED: CPT

## 2023-02-08 PROCEDURE — 80061 LIPID PANEL: CPT

## 2023-02-08 PROCEDURE — 77063 BREAST TOMOSYNTHESIS BI: CPT

## 2023-04-20 ENCOUNTER — OFFICE VISIT (OUTPATIENT)
Dept: PRIMARY CARE CLINIC | Age: 38
End: 2023-04-20
Payer: COMMERCIAL

## 2023-04-20 VITALS
HEART RATE: 82 BPM | HEIGHT: 61 IN | DIASTOLIC BLOOD PRESSURE: 68 MMHG | RESPIRATION RATE: 16 BRPM | SYSTOLIC BLOOD PRESSURE: 108 MMHG | BODY MASS INDEX: 26.24 KG/M2 | WEIGHT: 139 LBS

## 2023-04-20 DIAGNOSIS — L03.90 CELLULITIS, UNSPECIFIED CELLULITIS SITE: ICD-10-CM

## 2023-04-20 DIAGNOSIS — L29.9 ITCHING: ICD-10-CM

## 2023-04-20 DIAGNOSIS — R21 RASH: Primary | ICD-10-CM

## 2023-04-20 PROCEDURE — 99214 OFFICE O/P EST MOD 30 MIN: CPT | Performed by: STUDENT IN AN ORGANIZED HEALTH CARE EDUCATION/TRAINING PROGRAM

## 2023-04-20 PROCEDURE — G8427 DOCREV CUR MEDS BY ELIG CLIN: HCPCS | Performed by: STUDENT IN AN ORGANIZED HEALTH CARE EDUCATION/TRAINING PROGRAM

## 2023-04-20 PROCEDURE — 1036F TOBACCO NON-USER: CPT | Performed by: STUDENT IN AN ORGANIZED HEALTH CARE EDUCATION/TRAINING PROGRAM

## 2023-04-20 PROCEDURE — G8419 CALC BMI OUT NRM PARAM NOF/U: HCPCS | Performed by: STUDENT IN AN ORGANIZED HEALTH CARE EDUCATION/TRAINING PROGRAM

## 2023-04-20 RX ORDER — CLOBETASOL PROPIONATE 0.5 MG/G
OINTMENT TOPICAL 2 TIMES DAILY
Qty: 60 G | Refills: 0 | Status: SHIPPED | OUTPATIENT
Start: 2023-04-20 | End: 2023-04-27

## 2023-04-20 RX ORDER — SULFAMETHOXAZOLE AND TRIMETHOPRIM 800; 160 MG/1; MG/1
1 TABLET ORAL 2 TIMES DAILY
Qty: 14 TABLET | Refills: 0 | Status: SHIPPED | OUTPATIENT
Start: 2023-04-20 | End: 2023-04-27

## 2023-04-20 RX ORDER — HYDROXYZINE HYDROCHLORIDE 25 MG/1
25 TABLET, FILM COATED ORAL 4 TIMES DAILY PRN
Qty: 120 TABLET | Refills: 0 | Status: SHIPPED | OUTPATIENT
Start: 2023-04-20 | End: 2023-04-30

## 2023-04-20 RX ORDER — PREDNISONE 20 MG/1
20 TABLET ORAL 2 TIMES DAILY
Qty: 10 TABLET | Refills: 0 | Status: SHIPPED | OUTPATIENT
Start: 2023-04-20 | End: 2023-04-25

## 2023-04-20 ASSESSMENT — PATIENT HEALTH QUESTIONNAIRE - PHQ9
SUM OF ALL RESPONSES TO PHQ QUESTIONS 1-9: 0
SUM OF ALL RESPONSES TO PHQ QUESTIONS 1-9: 0
1. LITTLE INTEREST OR PLEASURE IN DOING THINGS: 0
2. FEELING DOWN, DEPRESSED OR HOPELESS: 0
SUM OF ALL RESPONSES TO PHQ9 QUESTIONS 1 & 2: 0
SUM OF ALL RESPONSES TO PHQ QUESTIONS 1-9: 0
SUM OF ALL RESPONSES TO PHQ QUESTIONS 1-9: 0

## 2023-04-20 NOTE — PROGRESS NOTES
and dry. Patient is not diaphoretic. Patient has a rash on the forearm with skin breakdown in the fingers and various locations on both arms. Psychiatric: Patient's speech is normal and behavior is normal. Thought content normal.   Vitals reviewed. Lab Results   Component Value Date    WBC 6.6 02/08/2023    HGB 13.7 02/08/2023    HCT 41.4 02/08/2023     02/08/2023    CHOL 195 02/08/2023    TRIG 180 (H) 02/08/2023    HDL 47 02/08/2023    ALT 18 02/08/2023    AST 17 02/08/2023     02/08/2023    K 3.8 02/08/2023     02/08/2023    CREATININE 0.47 (L) 02/08/2023    BUN 20 02/08/2023    CO2 23 02/08/2023    TSH 0.66 12/23/2021    LABA1C 4.4 12/30/2020     Lab Results   Component Value Date    CALCIUM 9.4 02/08/2023     Lab Results   Component Value Date    LDLCHOLESTEROL 112 02/08/2023       Please note that this chart was generated using voice recognition Dragon dictation software. Although every effort was made to ensure the accuracy of this automated transcription, some errors in transcription may have occurred.     Electronically signed by Dr. Netta Quan MD on 4/20/2023 at 2:44 PM

## 2023-06-07 ENCOUNTER — OFFICE VISIT (OUTPATIENT)
Dept: PRIMARY CARE CLINIC | Age: 38
End: 2023-06-07
Payer: COMMERCIAL

## 2023-06-07 VITALS
HEART RATE: 100 BPM | HEIGHT: 61 IN | TEMPERATURE: 98 F | RESPIRATION RATE: 18 BRPM | BODY MASS INDEX: 24.73 KG/M2 | DIASTOLIC BLOOD PRESSURE: 88 MMHG | WEIGHT: 131 LBS | OXYGEN SATURATION: 98 % | SYSTOLIC BLOOD PRESSURE: 119 MMHG

## 2023-06-07 DIAGNOSIS — J30.2 SEASONAL ALLERGIC RHINITIS, UNSPECIFIED TRIGGER: Primary | ICD-10-CM

## 2023-06-07 PROCEDURE — 1036F TOBACCO NON-USER: CPT | Performed by: NURSE PRACTITIONER

## 2023-06-07 PROCEDURE — 99213 OFFICE O/P EST LOW 20 MIN: CPT | Performed by: NURSE PRACTITIONER

## 2023-06-07 PROCEDURE — G8420 CALC BMI NORM PARAMETERS: HCPCS | Performed by: NURSE PRACTITIONER

## 2023-06-07 PROCEDURE — G8427 DOCREV CUR MEDS BY ELIG CLIN: HCPCS | Performed by: NURSE PRACTITIONER

## 2023-06-07 RX ORDER — MONTELUKAST SODIUM 10 MG/1
10 TABLET ORAL NIGHTLY
Qty: 30 TABLET | Refills: 3 | Status: SHIPPED | OUTPATIENT
Start: 2023-06-07

## 2023-06-07 RX ORDER — FLUTICASONE PROPIONATE 50 MCG
2 SPRAY, SUSPENSION (ML) NASAL DAILY
Qty: 16 G | Refills: 0 | Status: SHIPPED | OUTPATIENT
Start: 2023-06-07

## 2023-06-07 SDOH — ECONOMIC STABILITY: FOOD INSECURITY: WITHIN THE PAST 12 MONTHS, THE FOOD YOU BOUGHT JUST DIDN'T LAST AND YOU DIDN'T HAVE MONEY TO GET MORE.: NEVER TRUE

## 2023-06-07 SDOH — ECONOMIC STABILITY: FOOD INSECURITY: WITHIN THE PAST 12 MONTHS, YOU WORRIED THAT YOUR FOOD WOULD RUN OUT BEFORE YOU GOT MONEY TO BUY MORE.: NEVER TRUE

## 2023-06-07 SDOH — ECONOMIC STABILITY: HOUSING INSECURITY
IN THE LAST 12 MONTHS, WAS THERE A TIME WHEN YOU DID NOT HAVE A STEADY PLACE TO SLEEP OR SLEPT IN A SHELTER (INCLUDING NOW)?: NO

## 2023-06-07 SDOH — ECONOMIC STABILITY: INCOME INSECURITY: HOW HARD IS IT FOR YOU TO PAY FOR THE VERY BASICS LIKE FOOD, HOUSING, MEDICAL CARE, AND HEATING?: NOT HARD AT ALL

## 2023-06-07 ASSESSMENT — ENCOUNTER SYMPTOMS
RHINORRHEA: 1
SORE THROAT: 1

## 2023-06-07 NOTE — PATIENT INSTRUCTIONS
SURVEY:    You may be receiving a survey from [employer] regarding your visit today. Please complete the survey to enable us to provide the highest quality of care to you and your family. If you cannot score us a very good on any question, please call the office to discuss how we could of made your experience a very good one. Thank you.

## 2023-06-07 NOTE — PROGRESS NOTES
Name: Karina Lewis  : 1985         Chief Complaint:     Chief Complaint   Patient presents with    Allergies     Seasonal allergies. Scratchy throat, sneezing, crusty eyes. History of Present Illness: Karina Lewis is a 44 y.o.  female who presents with Allergies (Seasonal allergies. Scratchy throat, sneezing, crusty eyes. )      HPI    The patient presents with concerns of allergies. She admits scratchy throat and itchy eyes. Admits sneezing and eye watering. Denies fever or chills. Admits headaches. Admits nasal congestion and nasal drainage that is clear. She has used claritin, zyrtec, and benadryl. She is using OTC allergy eye drops. She has noted minimal benefit with these treatments. Past Medical History:     Past Medical History:   Diagnosis Date    Anxiety     Arthritis     Asthma     allergy induced    Depression     Eczema       Reviewed all health maintenance requirements and ordered appropriate tests  Health Maintenance Due   Topic Date Due    HIV screen  Never done    Hepatitis C screen  Never done    COVID-19 Vaccine (4 - Booster for [employer] series) 2022       Past Surgical History:     Past Surgical History:   Procedure Laterality Date    CHOLECYSTECTOMY      ENDOMETRIAL ABLATION      HYSTERECTOMY (CERVIX STATUS UNKNOWN)  2022    VAGINAL LAPAROSCOPIC ROBOTIC ASSISTED with bilateral salpingectomy     HYSTERECTOMY, VAGINAL N/A 3/28/2022    HYSTERECTOMY VAGINAL LAPAROSCOPIC ROBOTIC ASSISTED with bilateral salpingectomy [facility] Alon[facility]     LEG SURGERY Left     15 surgical procedures. TONSILLECTOMY          Medications:       Prior to Admission medications    Medication Sig Start Date End Date Taking?  Authorizing Provider   fluticasone (FLONASE) 50 MCG/ACT nasal spray 2 sprays by Each Nostril route daily 23  Yes MARGARETTE Cruz - CNP   montelukast (SINGULAIR) 10 MG tablet Take 1 tablet by mouth

## 2023-06-19 PROBLEM — F90.0 ATTENTION DEFICIT HYPERACTIVITY DISORDER (ADHD), PREDOMINANTLY INATTENTIVE TYPE: Status: ACTIVE | Noted: 2023-06-19

## 2023-07-18 RX ORDER — FLUTICASONE PROPIONATE 50 MCG
SPRAY, SUSPENSION (ML) NASAL
Qty: 16 G | Refills: 0 | Status: SHIPPED | OUTPATIENT
Start: 2023-07-18

## 2023-07-25 ENCOUNTER — OFFICE VISIT (OUTPATIENT)
Dept: PRIMARY CARE CLINIC | Age: 38
End: 2023-07-25
Payer: COMMERCIAL

## 2023-07-25 VITALS
SYSTOLIC BLOOD PRESSURE: 110 MMHG | OXYGEN SATURATION: 98 % | WEIGHT: 131 LBS | HEART RATE: 89 BPM | DIASTOLIC BLOOD PRESSURE: 70 MMHG | RESPIRATION RATE: 18 BRPM | TEMPERATURE: 97.6 F | HEIGHT: 61 IN | BODY MASS INDEX: 24.73 KG/M2

## 2023-07-25 DIAGNOSIS — F90.0 ATTENTION DEFICIT HYPERACTIVITY DISORDER (ADHD), PREDOMINANTLY INATTENTIVE TYPE: Primary | ICD-10-CM

## 2023-07-25 PROCEDURE — G8427 DOCREV CUR MEDS BY ELIG CLIN: HCPCS | Performed by: NURSE PRACTITIONER

## 2023-07-25 PROCEDURE — G8420 CALC BMI NORM PARAMETERS: HCPCS | Performed by: NURSE PRACTITIONER

## 2023-07-25 PROCEDURE — 99214 OFFICE O/P EST MOD 30 MIN: CPT | Performed by: NURSE PRACTITIONER

## 2023-07-25 PROCEDURE — 1036F TOBACCO NON-USER: CPT | Performed by: NURSE PRACTITIONER

## 2023-07-25 RX ORDER — DEXTROAMPHETAMINE SACCHARATE, AMPHETAMINE ASPARTATE MONOHYDRATE, DEXTROAMPHETAMINE SULFATE AND AMPHETAMINE SULFATE 2.5; 2.5; 2.5; 2.5 MG/1; MG/1; MG/1; MG/1
10 CAPSULE, EXTENDED RELEASE ORAL DAILY
Qty: 30 CAPSULE | Refills: 0 | Status: SHIPPED | OUTPATIENT
Start: 2023-07-25 | End: 2023-07-26 | Stop reason: SDUPTHER

## 2023-07-25 NOTE — PROGRESS NOTES
Name: Joleen Pryor  : 1985         Chief Complaint:     Chief Complaint   Patient presents with    ADHD     Patient states she is seeing a counselor who suggested add / adhd dx. History of Present Illness: Joleen Pryor is a 44 y.o.  female who presents with ADHD (Patient states she is seeing a counselor who suggested add / adhd dx. )      HPI    The patient presents with concerns of ADD/ADHD. She is enrolled in counseling who questioned a diagnosis of ADD. The patient states this is the second counselor who has mentioned this. The patient recently started online classes for her masters program and she states her thought process can \"de-rail\". She admits losing items while completing tasks. She admits troubles with listening and retaining information. She states her thoughts are \"here and there\". She states \"I feel like I have 20 web browsers open at one time\". She admits constantly feeling as if she figity. Admits small noises that can irritate her. She notices these symptoms in multiple setting such as school, work, and home. She admits difficulty with interrupting other people during conversation. She can't remember if she had these symptoms as a child.  She has never been formally evaluated for ADD/ADHD in the past.     Past Medical History:     Past Medical History:   Diagnosis Date    Anxiety     Arthritis     Asthma     allergy induced    Depression     Eczema       Reviewed all health maintenance requirements and ordered appropriate tests  Health Maintenance Due   Topic Date Due    HIV screen  Never done    Hepatitis C screen  Never done    COVID-19 Vaccine (4 - Booster for [employer] series) 2022       Past Surgical History:     Past Surgical History:   Procedure Laterality Date    CHOLECYSTECTOMY      ENDOMETRIAL ABLATION      HYSTERECTOMY (CERVIX STATUS UNKNOWN)  2022    VAGINAL LAPAROSCOPIC ROBOTIC ASSISTED with bilateral salpingectomy     HYSTERECTOMY, VAGINAL N/A 3/28/2022

## 2023-07-25 NOTE — PATIENT INSTRUCTIONS
SURVEY:    You may be receiving a survey from [employer] regarding your visit today. Please complete the survey to enable us to provide the highest quality of care to you and your family. If you cannot score us a very good on any question, please call the office to discuss how we could of made your experience a very good one. Thank you.

## 2023-07-26 DIAGNOSIS — F90.0 ATTENTION DEFICIT HYPERACTIVITY DISORDER (ADHD), PREDOMINANTLY INATTENTIVE TYPE: ICD-10-CM

## 2023-07-26 RX ORDER — DEXTROAMPHETAMINE SACCHARATE, AMPHETAMINE ASPARTATE MONOHYDRATE, DEXTROAMPHETAMINE SULFATE AND AMPHETAMINE SULFATE 2.5; 2.5; 2.5; 2.5 MG/1; MG/1; MG/1; MG/1
10 CAPSULE, EXTENDED RELEASE ORAL DAILY
Qty: 30 CAPSULE | Refills: 0 | Status: SHIPPED | OUTPATIENT
Start: 2023-07-26 | End: 2023-08-25

## 2023-07-26 NOTE — TELEPHONE ENCOUNTER
[facility] called stating that due to backorder they do not have adderall xr 10 mg caps in stock. [facility] in [place] has these in stock and patient is agreeable with new Rx to go to [facility] in [facility], Instead.  Rx pended

## 2023-08-03 ENCOUNTER — OFFICE VISIT (OUTPATIENT)
Dept: PRIMARY CARE CLINIC | Age: 38
End: 2023-08-03

## 2023-08-03 VITALS
OXYGEN SATURATION: 100 % | BODY MASS INDEX: 24.73 KG/M2 | WEIGHT: 131 LBS | SYSTOLIC BLOOD PRESSURE: 121 MMHG | HEIGHT: 61 IN | HEART RATE: 89 BPM | DIASTOLIC BLOOD PRESSURE: 69 MMHG | TEMPERATURE: 98.1 F

## 2023-08-03 DIAGNOSIS — S83.92XA SPRAIN OF LEFT KNEE, UNSPECIFIED LIGAMENT, INITIAL ENCOUNTER: Primary | ICD-10-CM

## 2023-08-03 DIAGNOSIS — M25.562 ACUTE PAIN OF LEFT KNEE: ICD-10-CM

## 2023-08-03 NOTE — PATIENT INSTRUCTIONS
SURVEY:    You may be receiving a survey from [employer] regarding your visit today. Please complete the survey to enable us to provide the highest quality of care to you and your family. If you cannot score us a very good on any question, please call the office to discuss how we could of made your experience a very good one. Thank you.

## 2023-08-03 NOTE — PROGRESS NOTES
edema, or warmth. Tenderness to palpation lateral aspect of left knee. Left knee ROM WNL with flexion and extension. Negative anterior or posterior drawer sign. Negative valgus or varus stress. Neurological:      Mental Status: She is alert. Psychiatric:         Mood and Affect: Mood normal.         Behavior: Behavior normal.         Thought Content: Thought content normal.         Judgment: Judgment normal.       Data:     Lab Results   Component Value Date/Time     02/08/2023 08:11 AM    K 3.8 02/08/2023 08:11 AM     02/08/2023 08:11 AM    CO2 23 02/08/2023 08:11 AM    BUN 20 02/08/2023 08:11 AM    CREATININE 0.47 02/08/2023 08:11 AM    GLUCOSE 82 02/08/2023 08:11 AM    PROT 7.2 02/08/2023 08:11 AM    LABALBU 4.5 02/08/2023 08:11 AM    BILITOT 0.3 02/08/2023 08:11 AM    ALKPHOS 72 02/08/2023 08:11 AM    AST 17 02/08/2023 08:11 AM    ALT 18 02/08/2023 08:11 AM     Lab Results   Component Value Date/Time    WBC 6.6 02/08/2023 08:11 AM    RBC 4.39 02/08/2023 08:11 AM    RBC 3.71 05/07/2012 06:50 PM    HGB 13.7 02/08/2023 08:11 AM    HCT 41.4 02/08/2023 08:11 AM    MCV 94.3 02/08/2023 08:11 AM    MCH 31.2 02/08/2023 08:11 AM    MCHC 33.1 02/08/2023 08:11 AM    RDW 13.2 02/08/2023 08:11 AM     02/08/2023 08:11 AM     05/07/2012 06:50 PM    MPV 10.0 02/08/2023 08:11 AM     Lab Results   Component Value Date/Time    TSH 0.66 12/23/2021 11:05 AM     Lab Results   Component Value Date/Time    CHOL 195 02/08/2023 08:11 AM    HDL 47 02/08/2023 08:11 AM    LABA1C 4.4 12/30/2020 11:06 AM       Assessment/Plan:      Diagnosis Orders   1. Sprain of left knee, unspecified ligament, initial encounter        2.  Acute pain of left knee [Z46.188 (ICD-10-CM)]          -- Symptoms ongoing x2 days  - Concern for knee sprain  - Encourage activity rest, ice, compression/support with OTC knee sleeve, elevation  - Patient does have Mobic at home, will use as needed  - Rx Voltaren gel ordered  - Will call

## 2023-08-14 ENCOUNTER — TELEPHONE (OUTPATIENT)
Dept: PRIMARY CARE CLINIC | Age: 38
End: 2023-08-14

## 2023-08-14 NOTE — TELEPHONE ENCOUNTER
----- Message from MARGARETTE Juan CNP sent at 8/14/2023 12:57 PM EDT -----  Please call patient and ask how she is doing in regards to her knee pain. If persistent/worsening pain, please offer PT referral   ----- Message -----  From: MARGARETTE Juan CNP  Sent: 8/14/2023  12:00 AM EDT  To: MARGARETTE Juan CNP    Call patient. How is she doing with her knee pain?  If not improved/worsened, order PT

## 2023-08-25 ENCOUNTER — OFFICE VISIT (OUTPATIENT)
Dept: PRIMARY CARE CLINIC | Age: 38
End: 2023-08-25
Payer: COMMERCIAL

## 2023-08-25 VITALS
TEMPERATURE: 98.1 F | RESPIRATION RATE: 14 BRPM | WEIGHT: 130 LBS | DIASTOLIC BLOOD PRESSURE: 68 MMHG | HEART RATE: 85 BPM | SYSTOLIC BLOOD PRESSURE: 98 MMHG | HEIGHT: 61 IN | BODY MASS INDEX: 24.55 KG/M2 | OXYGEN SATURATION: 99 %

## 2023-08-25 DIAGNOSIS — F90.0 ATTENTION DEFICIT HYPERACTIVITY DISORDER (ADHD), PREDOMINANTLY INATTENTIVE TYPE: Primary | ICD-10-CM

## 2023-08-25 PROCEDURE — G8427 DOCREV CUR MEDS BY ELIG CLIN: HCPCS | Performed by: NURSE PRACTITIONER

## 2023-08-25 PROCEDURE — 1036F TOBACCO NON-USER: CPT | Performed by: NURSE PRACTITIONER

## 2023-08-25 PROCEDURE — 99213 OFFICE O/P EST LOW 20 MIN: CPT | Performed by: NURSE PRACTITIONER

## 2023-08-25 PROCEDURE — G8420 CALC BMI NORM PARAMETERS: HCPCS | Performed by: NURSE PRACTITIONER

## 2023-08-25 RX ORDER — DEXTROAMPHETAMINE SACCHARATE, AMPHETAMINE ASPARTATE MONOHYDRATE, DEXTROAMPHETAMINE SULFATE AND AMPHETAMINE SULFATE 3.75; 3.75; 3.75; 3.75 MG/1; MG/1; MG/1; MG/1
15 CAPSULE, EXTENDED RELEASE ORAL DAILY
Qty: 30 CAPSULE | Refills: 0 | Status: SHIPPED | OUTPATIENT
Start: 2023-08-25 | End: 2023-09-24

## 2023-08-25 NOTE — PROGRESS NOTES
Name: Angelica Hartley  : 1985         Chief Complaint:     Chief Complaint   Patient presents with    ADHD     -patient is here for a medication follow up for ADHD. Her current treatment includes Adderall 10mg. Patient states she overall feels alright. However she notices the medication wearing off around 2:30pm everyday at work. Patient is interested in an increase in medication. History of Present Illness: Angelica Hartley is a 44 y.o.  female who presents with ADHD (-patient is here for a medication follow up for ADHD. Her current treatment includes Adderall 10mg. Patient states she overall feels alright. However she notices the medication wearing off around 2:30pm everyday at work. Patient is interested in an increase in medication. )      HPI    The patient presents for ADHD med check. She was seen in office 2023 when she was diagnosed with ADHD and started on Adderall XR 10 mg daily. Today, patient states she notices benefit with this medication. However, she does notice the medication wearing off around 2:30 PM every day while at work. She states she notices that it helps with focus but she thinks \"it can help more\". She is admits occasional restlessness at night and has noticed a small amount of decrease in appetite but is still eating normal meals. Denies palpitations, lightheadedness, or dizziness.  She is taking this medication around 7am.     Past Medical History:     Past Medical History:   Diagnosis Date    ADHD (attention deficit hyperactivity disorder) 23    Anxiety     Arthritis     Asthma     allergy induced    Depression     Eczema       Reviewed all health maintenance requirements and ordered appropriate tests  Health Maintenance Due   Topic Date Due    HIV screen  Never done    Hepatitis C screen  Never done    COVID-19 Vaccine (4 - Booster for [employer] series) 2022    Flu vaccine (1) 2023       Past Surgical History:     Past Surgical History:   Procedure

## 2023-08-25 NOTE — PATIENT INSTRUCTIONS
SURVEY:    You may be receiving a survey from [employer] regarding your visit today. Please complete the survey to enable us to provide the highest quality of care to you and your family. If you cannot score us a very good on any question, please call the office to discuss how we could have made your experience a very good one. Thank you.

## 2023-09-26 DIAGNOSIS — F90.0 ATTENTION DEFICIT HYPERACTIVITY DISORDER (ADHD), PREDOMINANTLY INATTENTIVE TYPE: ICD-10-CM

## 2023-09-26 RX ORDER — DEXTROAMPHETAMINE SACCHARATE, AMPHETAMINE ASPARTATE MONOHYDRATE, DEXTROAMPHETAMINE SULFATE AND AMPHETAMINE SULFATE 3.75; 3.75; 3.75; 3.75 MG/1; MG/1; MG/1; MG/1
15 CAPSULE, EXTENDED RELEASE ORAL DAILY
Qty: 30 CAPSULE | Refills: 0 | Status: SHIPPED | OUTPATIENT
Start: 2023-09-26 | End: 2023-11-09 | Stop reason: SDUPTHER

## 2023-10-06 ENCOUNTER — OFFICE VISIT (OUTPATIENT)
Dept: PRIMARY CARE CLINIC | Age: 38
End: 2023-10-06
Payer: COMMERCIAL

## 2023-10-06 VITALS
RESPIRATION RATE: 18 BRPM | SYSTOLIC BLOOD PRESSURE: 104 MMHG | HEART RATE: 92 BPM | DIASTOLIC BLOOD PRESSURE: 76 MMHG | BODY MASS INDEX: 23.22 KG/M2 | HEIGHT: 61 IN | TEMPERATURE: 98.8 F | WEIGHT: 123 LBS | OXYGEN SATURATION: 98 %

## 2023-10-06 DIAGNOSIS — F90.0 ATTENTION DEFICIT HYPERACTIVITY DISORDER (ADHD), PREDOMINANTLY INATTENTIVE TYPE: ICD-10-CM

## 2023-10-06 DIAGNOSIS — F41.9 ANXIETY AND DEPRESSION: ICD-10-CM

## 2023-10-06 DIAGNOSIS — F32.A ANXIETY AND DEPRESSION: ICD-10-CM

## 2023-10-06 DIAGNOSIS — R35.0 URINARY FREQUENCY: Primary | ICD-10-CM

## 2023-10-06 LAB
BILIRUBIN, POC: NORMAL
BLOOD URINE, POC: NORMAL
CLARITY, POC: NORMAL
COLOR, POC: NORMAL
GLUCOSE URINE, POC: NORMAL
KETONES, POC: NORMAL
LEUKOCYTE EST, POC: NORMAL
NITRITE, POC: NORMAL
PH, POC: 5
PROTEIN, POC: NORMAL
SPECIFIC GRAVITY, POC: 1.1
UROBILINOGEN, POC: NORMAL

## 2023-10-06 PROCEDURE — 1036F TOBACCO NON-USER: CPT | Performed by: NURSE PRACTITIONER

## 2023-10-06 PROCEDURE — G8484 FLU IMMUNIZE NO ADMIN: HCPCS | Performed by: NURSE PRACTITIONER

## 2023-10-06 PROCEDURE — 99214 OFFICE O/P EST MOD 30 MIN: CPT | Performed by: NURSE PRACTITIONER

## 2023-10-06 PROCEDURE — G8420 CALC BMI NORM PARAMETERS: HCPCS | Performed by: NURSE PRACTITIONER

## 2023-10-06 PROCEDURE — G8427 DOCREV CUR MEDS BY ELIG CLIN: HCPCS | Performed by: NURSE PRACTITIONER

## 2023-10-06 PROCEDURE — 81003 URINALYSIS AUTO W/O SCOPE: CPT | Performed by: NURSE PRACTITIONER

## 2023-10-06 RX ORDER — ESCITALOPRAM OXALATE 10 MG/1
TABLET ORAL
Qty: 30 TABLET | Refills: 5 | Status: SHIPPED | OUTPATIENT
Start: 2023-10-06

## 2023-10-06 NOTE — PROGRESS NOTES
Name: Bacilio Luciano  : 1985         Chief Complaint:     Chief Complaint   Patient presents with    Medication Check     Adderall 15mg . Doing good. Some issues falling asleep and staying asleep. But otherwise doing good       History of Present Illness: Bacilio Luciano is a 44 y.o.  female who presents with Medication Check (Adderall 15mg . Doing good. Some issues falling asleep and staying asleep. But otherwise doing good)      HPI    The patient presents for ADHD follow-up. Current treatment includes Adderall XR 15 mg daily. She states this medication is improving her focus when compared to the 10mg dose. She is noticing an increase in restless leg symptoms before bedtime. She was previously on ropinirole but this made her symptoms worse. She is taking the adderall 7:15-7:30am. She is also struggling with falling asleep and staying asleep She is also waking up to urinate and has some urinary hesitancy. She does not notice these symptoms during the day. Denies dysuria. She has also been feeling more anxious described as \"pit in my stomach even though nothing is wrong\". She has been thinking of worst case scenario and feeling on edge even though nothing is wrong. She has been completing counseling. She was previously taking cymbalta, topamax, and zoloft in the past and was following with psychiatry. She was on trazodone in the past which caused lingering drowsiness. She has also tried magnesium, tea, and melatonin with no relief in improving sleep.       Past Medical History:     Past Medical History:   Diagnosis Date    ADHD (attention deficit hyperactivity disorder) 23    Anxiety     Arthritis     Asthma     allergy induced    Depression     Eczema       Reviewed all health maintenance requirements and ordered appropriate tests  Health Maintenance Due   Topic Date Due    Hepatitis B vaccine (1 of 3 - 3-dose series) 1985    HIV screen  Never done    Hepatitis C screen  Never done    COVID-19

## 2023-10-06 NOTE — PATIENT INSTRUCTIONS
SURVEY:    You may be receiving a survey from [employer] regarding your visit today. Please complete the survey to enable us to provide the highest quality of care to you and your family. If you cannot score us a very good on any question, please call the office to discuss how we could of made your experience a very good one. Thank you.

## 2023-10-09 PROBLEM — F32.A ANXIETY AND DEPRESSION: Status: ACTIVE | Noted: 2023-10-09

## 2023-10-09 PROBLEM — F41.9 ANXIETY AND DEPRESSION: Status: ACTIVE | Noted: 2023-10-09

## 2023-10-09 RX ORDER — FLUTICASONE PROPIONATE 50 MCG
SPRAY, SUSPENSION (ML) NASAL
Qty: 16 G | Refills: 0 | Status: SHIPPED | OUTPATIENT
Start: 2023-10-09

## 2023-11-07 ENCOUNTER — OFFICE VISIT (OUTPATIENT)
Dept: PRIMARY CARE CLINIC | Age: 38
End: 2023-11-07
Payer: COMMERCIAL

## 2023-11-07 VITALS
HEIGHT: 61 IN | TEMPERATURE: 97.9 F | BODY MASS INDEX: 23.41 KG/M2 | OXYGEN SATURATION: 98 % | SYSTOLIC BLOOD PRESSURE: 102 MMHG | DIASTOLIC BLOOD PRESSURE: 80 MMHG | RESPIRATION RATE: 18 BRPM | HEART RATE: 88 BPM | WEIGHT: 124 LBS

## 2023-11-07 DIAGNOSIS — F32.A ANXIETY AND DEPRESSION: ICD-10-CM

## 2023-11-07 DIAGNOSIS — F90.0 ATTENTION DEFICIT HYPERACTIVITY DISORDER (ADHD), PREDOMINANTLY INATTENTIVE TYPE: Primary | ICD-10-CM

## 2023-11-07 DIAGNOSIS — F41.9 ANXIETY AND DEPRESSION: ICD-10-CM

## 2023-11-07 PROCEDURE — G8427 DOCREV CUR MEDS BY ELIG CLIN: HCPCS | Performed by: NURSE PRACTITIONER

## 2023-11-07 PROCEDURE — G8420 CALC BMI NORM PARAMETERS: HCPCS | Performed by: NURSE PRACTITIONER

## 2023-11-07 PROCEDURE — G8484 FLU IMMUNIZE NO ADMIN: HCPCS | Performed by: NURSE PRACTITIONER

## 2023-11-07 PROCEDURE — 1036F TOBACCO NON-USER: CPT | Performed by: NURSE PRACTITIONER

## 2023-11-07 PROCEDURE — 99214 OFFICE O/P EST MOD 30 MIN: CPT | Performed by: NURSE PRACTITIONER

## 2023-11-07 RX ORDER — ESCITALOPRAM OXALATE 20 MG/1
20 TABLET ORAL DAILY
Qty: 30 TABLET | Refills: 5 | Status: SHIPPED | OUTPATIENT
Start: 2023-11-07

## 2023-11-07 NOTE — PATIENT INSTRUCTIONS
SURVEY:    You may be receiving a survey from [employer] regarding your visit today. Please complete the survey to enable us to provide the highest quality of care to you and your family. If you cannot score us a very good on any question, please call the office to discuss how we could of made your experience a very good one. Thank you.

## 2023-11-07 NOTE — PROGRESS NOTES
Requested Prescriptions     Signed Prescriptions Disp Refills    escitalopram (LEXAPRO) 20 MG tablet 30 tablet 5     Sig: Take 1 tablet by mouth daily       No orders of the defined types were placed in this encounter. No results found for this visit on 11/07/23. Return if symptoms worsen or fail to improve.     Electronically signed by MARGARETTE Bradley CNP on 11/08/23 at 8:03 AM.

## 2023-11-09 DIAGNOSIS — F90.0 ATTENTION DEFICIT HYPERACTIVITY DISORDER (ADHD), PREDOMINANTLY INATTENTIVE TYPE: ICD-10-CM

## 2023-11-09 RX ORDER — DEXTROAMPHETAMINE SACCHARATE, AMPHETAMINE ASPARTATE MONOHYDRATE, DEXTROAMPHETAMINE SULFATE AND AMPHETAMINE SULFATE 3.75; 3.75; 3.75; 3.75 MG/1; MG/1; MG/1; MG/1
15 CAPSULE, EXTENDED RELEASE ORAL DAILY
Qty: 30 CAPSULE | Refills: 0 | Status: SHIPPED | OUTPATIENT
Start: 2023-11-09 | End: 2023-12-09

## 2023-11-20 ENCOUNTER — TELEPHONE (OUTPATIENT)
Dept: PRIMARY CARE CLINIC | Age: 38
End: 2023-11-20

## 2023-11-20 NOTE — TELEPHONE ENCOUNTER
----- Message from Jose Carpio sent at 11/17/2023  5:02 PM EST -----  Regarding: Mammogram   Contact: [phone]  Hello,  I had recieved a letter stating I was due for my next mammogram. I called to schedule and they said there was not an order. I do believe I was supposed to have one every 6 months due to being high risk. I also think I may have found a potential lump on my left breast. I did go ahead and schedule my yearly mammogram for Feb but wanted to check on if I was supposed to get one earlier.   Thank you

## 2023-11-20 NOTE — TELEPHONE ENCOUNTER
If she found a lump on her breast I recommend she schedule an appt for breast exam. From here, we can discuss DIAGNOSTIC mammogram and US orders

## 2023-11-22 ENCOUNTER — OFFICE VISIT (OUTPATIENT)
Dept: PRIMARY CARE CLINIC | Age: 38
End: 2023-11-22
Payer: COMMERCIAL

## 2023-11-22 VITALS
DIASTOLIC BLOOD PRESSURE: 72 MMHG | HEART RATE: 92 BPM | TEMPERATURE: 97.8 F | OXYGEN SATURATION: 99 % | HEIGHT: 61 IN | SYSTOLIC BLOOD PRESSURE: 110 MMHG | BODY MASS INDEX: 23.03 KG/M2 | WEIGHT: 122 LBS

## 2023-11-22 DIAGNOSIS — N63.23 MASS OF LOWER OUTER QUADRANT OF LEFT BREAST: Primary | ICD-10-CM

## 2023-11-22 PROCEDURE — 99213 OFFICE O/P EST LOW 20 MIN: CPT | Performed by: NURSE PRACTITIONER

## 2023-11-22 PROCEDURE — G8420 CALC BMI NORM PARAMETERS: HCPCS | Performed by: NURSE PRACTITIONER

## 2023-11-22 PROCEDURE — G8427 DOCREV CUR MEDS BY ELIG CLIN: HCPCS | Performed by: NURSE PRACTITIONER

## 2023-11-22 PROCEDURE — G8484 FLU IMMUNIZE NO ADMIN: HCPCS | Performed by: NURSE PRACTITIONER

## 2023-11-22 PROCEDURE — 1036F TOBACCO NON-USER: CPT | Performed by: NURSE PRACTITIONER

## 2023-11-22 RX ORDER — MELOXICAM 15 MG/1
TABLET ORAL
COMMUNITY
Start: 2023-08-31

## 2023-11-22 NOTE — PATIENT INSTRUCTIONS
SURVEY:    You may be receiving a survey from [facility] regarding your visit today. Please complete the survey to enable us to provide the highest quality of care to you and your family. If you cannot score us a very good on any question, please call the office to discuss how we could have made your experience a very good one. Thank you.

## 2023-11-22 NOTE — PROGRESS NOTES
Name: Jes Vergara  : 1985         Chief Complaint:     Chief Complaint   Patient presents with    Breast Problem     -left side breast pain  -felt a possibly lump  -dull pain comes and goes without being touched  -denies redness, swelling or hot to the touch  -denies any treatment  [place] [facility] [place]       History of Present Illness: Jes Vergara is a 44 y.o.  female who presents with Breast Problem (-left side breast pain/-felt a possibly lump/-dull pain comes and goes without being touched/-denies redness, swelling or hot to the touch/-denies any treatment/-[place] [facility] [place]      HPI    The patient presents with left-sided breast pain that began 5 days ago. Pain is described as dull ache. Pain is random and occurs without palpation. She does question feeling a lump in her left breast. She denies redness, swelling, or warmth to the breast. She has not used any treatment. Most recent mammogram 2023 benign. Fam history of breast cancer includes maternal aunt (estimated age late 35s) and sister (diagnosed at age 39). Denies nipple drainage or bleeding. She is averaging 1-2 cups of coffee daily in regards to caffeine. She is s/p hysterectomy 3/2022.      Past Medical History:     Past Medical History:   Diagnosis Date    ADHD (attention deficit hyperactivity disorder) 22    Anxiety     Arthritis     Asthma     allergy induced    Depression     Eczema       Reviewed all health maintenance requirements and ordered appropriate tests  Health Maintenance Due   Topic Date Due    Hepatitis B vaccine (1 of 3 - 3-dose series) 1985    HIV screen  Never done    Hepatitis C screen  Never done    Flu vaccine (1) 2023    COVID-19 Vaccine ( season) 2023       Past Surgical History:     Past Surgical History:   Procedure Laterality Date    CHOLECYSTECTOMY      ENDOMETRIAL ABLATION      HYSTERECTOMY (CERVIX STATUS UNKNOWN)  2022    VAGINAL LAPAROSCOPIC

## 2023-12-04 ENCOUNTER — HOSPITAL ENCOUNTER (OUTPATIENT)
Dept: WOMENS IMAGING | Age: 38
Discharge: HOME | End: 2023-12-06
Payer: COMMERCIAL

## 2023-12-04 ENCOUNTER — HOSPITAL ENCOUNTER (OUTPATIENT)
Dept: ULTRASOUND IMAGING | Age: 38
Discharge: HOME | End: 2023-12-06
Payer: COMMERCIAL

## 2023-12-04 VITALS — WEIGHT: 122 LBS | BODY MASS INDEX: 23.03 KG/M2 | HEIGHT: 61 IN

## 2023-12-04 DIAGNOSIS — N63.23 MASS OF LOWER OUTER QUADRANT OF LEFT BREAST: ICD-10-CM

## 2023-12-04 PROCEDURE — 76642 ULTRASOUND BREAST LIMITED: CPT

## 2023-12-04 PROCEDURE — G0279 TOMOSYNTHESIS, MAMMO: HCPCS

## 2023-12-27 ENCOUNTER — OFFICE VISIT (OUTPATIENT)
Dept: PRIMARY CARE CLINIC | Age: 38
End: 2023-12-27
Payer: COMMERCIAL

## 2023-12-27 VITALS
TEMPERATURE: 97.3 F | WEIGHT: 126 LBS | SYSTOLIC BLOOD PRESSURE: 90 MMHG | HEART RATE: 75 BPM | OXYGEN SATURATION: 98 % | BODY MASS INDEX: 23.79 KG/M2 | HEIGHT: 61 IN | DIASTOLIC BLOOD PRESSURE: 70 MMHG

## 2023-12-27 DIAGNOSIS — F41.9 ANXIETY AND DEPRESSION: ICD-10-CM

## 2023-12-27 DIAGNOSIS — Z11.4 ENCOUNTER FOR SCREENING FOR HIV: ICD-10-CM

## 2023-12-27 DIAGNOSIS — L30.9 ECZEMA, UNSPECIFIED TYPE: ICD-10-CM

## 2023-12-27 DIAGNOSIS — F90.0 ATTENTION DEFICIT HYPERACTIVITY DISORDER (ADHD), PREDOMINANTLY INATTENTIVE TYPE: ICD-10-CM

## 2023-12-27 DIAGNOSIS — F32.A ANXIETY AND DEPRESSION: ICD-10-CM

## 2023-12-27 DIAGNOSIS — Z11.59 NEED FOR HEPATITIS C SCREENING TEST: ICD-10-CM

## 2023-12-27 DIAGNOSIS — Z00.00 WELLNESS EXAMINATION: Primary | ICD-10-CM

## 2023-12-27 PROCEDURE — G8484 FLU IMMUNIZE NO ADMIN: HCPCS | Performed by: NURSE PRACTITIONER

## 2023-12-27 PROCEDURE — 99395 PREV VISIT EST AGE 18-39: CPT | Performed by: NURSE PRACTITIONER

## 2023-12-27 NOTE — PROGRESS NOTES
Name: Tiffanie Carlisle  : 1985         Chief Complaint:     Chief Complaint   Patient presents with    Annual Exam     -denies any issues       History of Present Illness: Tiffanie Carlisle is a 44 y.o.  female who presents with Annual Exam (-denies any issues)      HPI    Wellness:  She admits well balanced diet, outside of the recent holiday. For exercise she notes stationary biking several times weekly for 20 minutes. She admits routine eye exams. She admits routine dental exams. She is vaccinated for covid. She is not UTD flu vaccine. Most recent tetanus vaccine 2018. 2023 mammogram benign. S/p hysterectomy 3/2022 with cervix removal. She admits fam hx of breast cancer (older sister and maternal aunt). She admits fam hx of colorectal cancer (grandfather, unsure paternal or maternal, estimated age diagnosed 39-47s). Mental Health:  Current treatment includes lexapro 20mg QD and adderall 15mg XR for hx of anxiety, depression, and ADHD. Today, she states things are going \"good\". She admits sometimes she does have certain days where her adderall dose wears off or is is not as effective as she would like. However, most days it is \"pretty good\".      Past Medical History:     Past Medical History:   Diagnosis Date    ADHD (attention deficit hyperactivity disorder) 22    Anxiety     Arthritis     Asthma     allergy induced    Depression     Eczema       Reviewed all health maintenance requirements and ordered appropriate tests  Health Maintenance Due   Topic Date Due    Hepatitis B vaccine (1 of 3 - 3-dose series) 1985    HIV screen  Never done    Hepatitis C screen  Never done    Flu vaccine (1) 2023    COVID-19 Vaccine ( season) 2023       Past Surgical History:     Past Surgical History:   Procedure Laterality Date    CHOLECYSTECTOMY      ENDOMETRIAL ABLATION      HYSTERECTOMY (CERVIX STATUS UNKNOWN)  2022    VAGINAL LAPAROSCOPIC ROBOTIC ASSISTED with bilateral

## 2023-12-29 ENCOUNTER — PATIENT MESSAGE (OUTPATIENT)
Dept: PRIMARY CARE CLINIC | Age: 38
End: 2023-12-29

## 2024-01-02 RX ORDER — ALBUTEROL SULFATE 90 UG/1
2 AEROSOL, METERED RESPIRATORY (INHALATION) EVERY 4 HOURS PRN
Qty: 18 G | Refills: 3 | Status: SHIPPED | OUTPATIENT
Start: 2024-01-02

## 2024-01-02 NOTE — TELEPHONE ENCOUNTER
From: Tari Carpio  To: Rena Delgado  Sent: 12/29/2023 11:11 PM EST  Subject: Inhaler Refill    I need a refill sent to [facility] in [place] for my Albuterol Sulfate HFA inhaler.

## 2024-01-28 DIAGNOSIS — F90.0 ATTENTION DEFICIT HYPERACTIVITY DISORDER (ADHD), PREDOMINANTLY INATTENTIVE TYPE: ICD-10-CM

## 2024-01-29 RX ORDER — DEXTROAMPHETAMINE SACCHARATE, AMPHETAMINE ASPARTATE MONOHYDRATE, DEXTROAMPHETAMINE SULFATE AND AMPHETAMINE SULFATE 3.75; 3.75; 3.75; 3.75 MG/1; MG/1; MG/1; MG/1
15 CAPSULE, EXTENDED RELEASE ORAL DAILY
Qty: 30 CAPSULE | Refills: 0 | Status: SHIPPED | OUTPATIENT
Start: 2024-01-29 | End: 2024-02-28

## 2024-02-12 ENCOUNTER — HOSPITAL ENCOUNTER (OUTPATIENT)
Age: 39
Discharge: HOME | End: 2024-02-12

## 2024-02-12 ENCOUNTER — ANCILLARY ORDERS (OUTPATIENT)
Dept: WOMENS IMAGING | Age: 39
End: 2024-02-12

## 2024-02-12 ENCOUNTER — HOSPITAL ENCOUNTER (OUTPATIENT)
Dept: WOMENS IMAGING | Age: 39
Discharge: HOME | End: 2024-02-14
Payer: COMMERCIAL

## 2024-02-12 DIAGNOSIS — R92.8 ABNORMAL SCREENING MAMMOGRAM: ICD-10-CM

## 2024-02-12 DIAGNOSIS — R92.8 ABNORMAL MAMMOGRAM OF RIGHT BREAST: Primary | ICD-10-CM

## 2024-02-12 DIAGNOSIS — N63.10 MASS OF RIGHT BREAST, UNSPECIFIED QUADRANT: ICD-10-CM

## 2024-02-12 DIAGNOSIS — Z12.39 SCREENING BREAST EXAMINATION: Primary | ICD-10-CM

## 2024-02-12 DIAGNOSIS — Z12.39 SCREENING BREAST EXAMINATION: ICD-10-CM

## 2024-02-12 PROCEDURE — 77063 BREAST TOMOSYNTHESIS BI: CPT

## 2024-02-26 ENCOUNTER — HOSPITAL ENCOUNTER (OUTPATIENT)
Dept: WOMENS IMAGING | Age: 39
Discharge: HOME | End: 2024-02-28
Payer: COMMERCIAL

## 2024-02-26 ENCOUNTER — HOSPITAL ENCOUNTER (OUTPATIENT)
Dept: ULTRASOUND IMAGING | Age: 39
Discharge: HOME | End: 2024-02-28
Payer: COMMERCIAL

## 2024-02-26 DIAGNOSIS — N63.10 MASS OF RIGHT BREAST, UNSPECIFIED QUADRANT: ICD-10-CM

## 2024-02-26 DIAGNOSIS — R92.8 ABNORMAL MAMMOGRAM OF RIGHT BREAST: ICD-10-CM

## 2024-02-26 DIAGNOSIS — R92.8 ABNORMAL SCREENING MAMMOGRAM: ICD-10-CM

## 2024-02-26 DIAGNOSIS — F90.0 ATTENTION DEFICIT HYPERACTIVITY DISORDER (ADHD), PREDOMINANTLY INATTENTIVE TYPE: ICD-10-CM

## 2024-02-26 PROCEDURE — 77066 DX MAMMO INCL CAD BI: CPT

## 2024-02-26 PROCEDURE — 76642 ULTRASOUND BREAST LIMITED: CPT

## 2024-02-27 RX ORDER — DEXTROAMPHETAMINE SACCHARATE, AMPHETAMINE ASPARTATE MONOHYDRATE, DEXTROAMPHETAMINE SULFATE AND AMPHETAMINE SULFATE 3.75; 3.75; 3.75; 3.75 MG/1; MG/1; MG/1; MG/1
15 CAPSULE, EXTENDED RELEASE ORAL DAILY
Qty: 30 CAPSULE | Refills: 0 | Status: SHIPPED | OUTPATIENT
Start: 2024-02-27 | End: 2024-03-28

## 2024-03-27 ENCOUNTER — HOSPITAL ENCOUNTER (OUTPATIENT)
Age: 39
Discharge: HOME | End: 2024-03-27
Payer: COMMERCIAL

## 2024-03-27 ENCOUNTER — OFFICE VISIT (OUTPATIENT)
Dept: PRIMARY CARE CLINIC | Age: 39
End: 2024-03-27
Payer: COMMERCIAL

## 2024-03-27 VITALS
SYSTOLIC BLOOD PRESSURE: 94 MMHG | WEIGHT: 128 LBS | BODY MASS INDEX: 24.2 KG/M2 | DIASTOLIC BLOOD PRESSURE: 62 MMHG | TEMPERATURE: 97.3 F | HEART RATE: 78 BPM | OXYGEN SATURATION: 95 %

## 2024-03-27 DIAGNOSIS — Z11.59 NEED FOR HEPATITIS C SCREENING TEST: ICD-10-CM

## 2024-03-27 DIAGNOSIS — Z00.00 WELLNESS EXAMINATION: ICD-10-CM

## 2024-03-27 DIAGNOSIS — G89.29 CHRONIC MIDLINE LOW BACK PAIN WITHOUT SCIATICA: ICD-10-CM

## 2024-03-27 DIAGNOSIS — F90.0 ATTENTION DEFICIT HYPERACTIVITY DISORDER (ADHD), PREDOMINANTLY INATTENTIVE TYPE: Primary | ICD-10-CM

## 2024-03-27 DIAGNOSIS — Z11.4 ENCOUNTER FOR SCREENING FOR HIV: ICD-10-CM

## 2024-03-27 DIAGNOSIS — M54.50 CHRONIC MIDLINE LOW BACK PAIN WITHOUT SCIATICA: ICD-10-CM

## 2024-03-27 LAB
ALBUMIN SERPL-MCNC: 4.3 G/DL (ref 3.5–5.2)
ALBUMIN/GLOB SERPL: 1.5 {RATIO} (ref 1–2.5)
ALP SERPL-CCNC: 53 U/L (ref 35–104)
ALT SERPL-CCNC: 13 U/L (ref 5–33)
ANION GAP SERPL CALCULATED.3IONS-SCNC: 10 MMOL/L (ref 9–17)
AST SERPL-CCNC: 15 U/L
BILIRUB SERPL-MCNC: 0.4 MG/DL (ref 0.3–1.2)
BUN SERPL-MCNC: 15 MG/DL (ref 6–20)
BUN/CREAT SERPL: 30 (ref 9–20)
CALCIUM SERPL-MCNC: 9.1 MG/DL (ref 8.6–10.4)
CHLORIDE SERPL-SCNC: 104 MMOL/L (ref 98–107)
CHOLEST SERPL-MCNC: 201 MG/DL (ref 0–199)
CHOLESTEROL/HDL RATIO: 3
CO2 SERPL-SCNC: 25 MMOL/L (ref 20–31)
CREAT SERPL-MCNC: 0.5 MG/DL (ref 0.5–0.9)
ERYTHROCYTE [DISTWIDTH] IN BLOOD BY AUTOMATED COUNT: 12.5 % (ref 11.8–14.4)
GFR SERPL CREATININE-BSD FRML MDRD: >90 ML/MIN/1.73M2
GLUCOSE SERPL-MCNC: 87 MG/DL (ref 70–99)
HCT VFR BLD AUTO: 40.5 % (ref 36.3–47.1)
HCV AB SERPL QL IA: NONREACTIVE
HDLC SERPL-MCNC: 62 MG/DL
HGB BLD-MCNC: 13.8 G/DL (ref 11.9–15.1)
HIV 1+2 AB+HIV1 P24 AG SERPL QL IA: NONREACTIVE
LDLC SERPL CALC-MCNC: 117 MG/DL (ref 0–100)
MCH RBC QN AUTO: 31.9 PG (ref 25.2–33.5)
MCHC RBC AUTO-ENTMCNC: 34.1 G/DL (ref 28.4–34.8)
MCV RBC AUTO: 93.8 FL (ref 82.6–102.9)
NRBC BLD-RTO: 0 PER 100 WBC
PLATELET # BLD AUTO: 313 K/UL (ref 138–453)
PMV BLD AUTO: 9.6 FL (ref 8.1–13.5)
POTASSIUM SERPL-SCNC: 4.4 MMOL/L (ref 3.7–5.3)
PROT SERPL-MCNC: 7.1 G/DL (ref 6.4–8.3)
RBC # BLD AUTO: 4.32 M/UL (ref 3.95–5.11)
SODIUM SERPL-SCNC: 139 MMOL/L (ref 135–144)
TRIGL SERPL-MCNC: 110 MG/DL
VLDLC SERPL CALC-MCNC: 22 MG/DL
WBC OTHER # BLD: 5.9 K/UL (ref 3.5–11.3)

## 2024-03-27 PROCEDURE — G8427 DOCREV CUR MEDS BY ELIG CLIN: HCPCS | Performed by: NURSE PRACTITIONER

## 2024-03-27 PROCEDURE — 85027 COMPLETE CBC AUTOMATED: CPT

## 2024-03-27 PROCEDURE — 99214 OFFICE O/P EST MOD 30 MIN: CPT | Performed by: NURSE PRACTITIONER

## 2024-03-27 PROCEDURE — 1036F TOBACCO NON-USER: CPT | Performed by: NURSE PRACTITIONER

## 2024-03-27 PROCEDURE — 36415 COLL VENOUS BLD VENIPUNCTURE: CPT

## 2024-03-27 PROCEDURE — 86803 HEPATITIS C AB TEST: CPT

## 2024-03-27 PROCEDURE — 80061 LIPID PANEL: CPT

## 2024-03-27 PROCEDURE — 87389 HIV-1 AG W/HIV-1&-2 AB AG IA: CPT

## 2024-03-27 PROCEDURE — G8484 FLU IMMUNIZE NO ADMIN: HCPCS | Performed by: NURSE PRACTITIONER

## 2024-03-27 PROCEDURE — 80053 COMPREHEN METABOLIC PANEL: CPT

## 2024-03-27 PROCEDURE — G8420 CALC BMI NORM PARAMETERS: HCPCS | Performed by: NURSE PRACTITIONER

## 2024-03-27 RX ORDER — DEXTROAMPHETAMINE SACCHARATE, AMPHETAMINE ASPARTATE MONOHYDRATE, DEXTROAMPHETAMINE SULFATE AND AMPHETAMINE SULFATE 5; 5; 5; 5 MG/1; MG/1; MG/1; MG/1
20 CAPSULE, EXTENDED RELEASE ORAL EVERY MORNING
Qty: 30 CAPSULE | Refills: 0 | Status: SHIPPED | OUTPATIENT
Start: 2024-03-27 | End: 2024-04-26

## 2024-03-27 ASSESSMENT — PATIENT HEALTH QUESTIONNAIRE - PHQ9
6. FEELING BAD ABOUT YOURSELF - OR THAT YOU ARE A FAILURE OR HAVE LET YOURSELF OR YOUR FAMILY DOWN: NOT AT ALL
SUM OF ALL RESPONSES TO PHQ QUESTIONS 1-9: 7
4. FEELING TIRED OR HAVING LITTLE ENERGY: MORE THAN HALF THE DAYS
10. IF YOU CHECKED OFF ANY PROBLEMS, HOW DIFFICULT HAVE THESE PROBLEMS MADE IT FOR YOU TO DO YOUR WORK, TAKE CARE OF THINGS AT HOME, OR GET ALONG WITH OTHER PEOPLE: SOMEWHAT DIFFICULT
5. POOR APPETITE OR OVEREATING: SEVERAL DAYS
9. THOUGHTS THAT YOU WOULD BE BETTER OFF DEAD, OR OF HURTING YOURSELF: NOT AT ALL
9. THOUGHTS THAT YOU WOULD BE BETTER OFF DEAD, OR OF HURTING YOURSELF: NOT AT ALL
1. LITTLE INTEREST OR PLEASURE IN DOING THINGS: SEVERAL DAYS
7. TROUBLE CONCENTRATING ON THINGS, SUCH AS READING THE NEWSPAPER OR WATCHING TELEVISION: SEVERAL DAYS
8. MOVING OR SPEAKING SO SLOWLY THAT OTHER PEOPLE COULD HAVE NOTICED. OR THE OPPOSITE - BEING SO FIDGETY OR RESTLESS THAT YOU HAVE BEEN MOVING AROUND A LOT MORE THAN USUAL: NOT AT ALL
6. FEELING BAD ABOUT YOURSELF - OR THAT YOU ARE A FAILURE OR HAVE LET YOURSELF OR YOUR FAMILY DOWN: NOT AT ALL
SUM OF ALL RESPONSES TO PHQ QUESTIONS 1-9: 7
2. FEELING DOWN, DEPRESSED OR HOPELESS: NOT AT ALL
8. MOVING OR SPEAKING SO SLOWLY THAT OTHER PEOPLE COULD HAVE NOTICED. OR THE OPPOSITE, BEING SO FIGETY OR RESTLESS THAT YOU HAVE BEEN MOVING AROUND A LOT MORE THAN USUAL: NOT AT ALL
1. LITTLE INTEREST OR PLEASURE IN DOING THINGS: SEVERAL DAYS
4. FEELING TIRED OR HAVING LITTLE ENERGY: MORE THAN HALF THE DAYS
10. IF YOU CHECKED OFF ANY PROBLEMS, HOW DIFFICULT HAVE THESE PROBLEMS MADE IT FOR YOU TO DO YOUR WORK, TAKE CARE OF THINGS AT HOME, OR GET ALONG WITH OTHER PEOPLE: SOMEWHAT DIFFICULT
3. TROUBLE FALLING OR STAYING ASLEEP: MORE THAN HALF THE DAYS
SUM OF ALL RESPONSES TO PHQ QUESTIONS 1-9: 7
SUM OF ALL RESPONSES TO PHQ QUESTIONS 1-9: 7
7. TROUBLE CONCENTRATING ON THINGS, SUCH AS READING THE NEWSPAPER OR WATCHING TELEVISION: SEVERAL DAYS
SUM OF ALL RESPONSES TO PHQ QUESTIONS 1-9: 7
SUM OF ALL RESPONSES TO PHQ9 QUESTIONS 1 & 2: 1
5. POOR APPETITE OR OVEREATING: SEVERAL DAYS
3. TROUBLE FALLING OR STAYING ASLEEP: MORE THAN HALF THE DAYS
2. FEELING DOWN, DEPRESSED OR HOPELESS: NOT AT ALL

## 2024-03-27 ASSESSMENT — ENCOUNTER SYMPTOMS: BACK PAIN: 1

## 2024-03-27 NOTE — PROGRESS NOTES
pain.   Psychiatric/Behavioral:  Positive for decreased concentration and sleep disturbance.        Physical Exam:   Vitals:  BP 94/62   Pulse 78   Temp 97.3 °F (36.3 °C)   Wt 58.1 kg (128 lb)   LMP 03/14/2022   SpO2 95%   BMI 24.20 kg/m²     Physical Exam  Constitutional:       General: She is not in acute distress.     Appearance: Normal appearance. She is normal weight. She is not ill-appearing or toxic-appearing.   Cardiovascular:      Rate and Rhythm: Normal rate and regular rhythm.      Heart sounds: Normal heart sounds. No murmur heard.  Pulmonary:      Effort: Pulmonary effort is normal. No respiratory distress.      Breath sounds: Normal breath sounds. No stridor. No wheezing, rhonchi or rales.   Neurological:      Mental Status: She is alert.   Psychiatric:         Mood and Affect: Mood normal.         Behavior: Behavior normal.         Thought Content: Thought content normal.         Judgment: Judgment normal.         Data:     Lab Results   Component Value Date/Time     03/27/2024 08:58 AM    K 4.4 03/27/2024 08:58 AM     03/27/2024 08:58 AM    CO2 25 03/27/2024 08:58 AM    BUN 15 03/27/2024 08:58 AM    CREATININE 0.5 03/27/2024 08:58 AM    GLUCOSE 87 03/27/2024 08:58 AM    PROT 7.1 03/27/2024 08:58 AM    LABALBU 4.3 03/27/2024 08:58 AM    BILITOT 0.4 03/27/2024 08:58 AM    ALKPHOS 53 03/27/2024 08:58 AM    AST 15 03/27/2024 08:58 AM    ALT 13 03/27/2024 08:58 AM     Lab Results   Component Value Date/Time    WBC 5.9 03/27/2024 08:58 AM    RBC 4.32 03/27/2024 08:58 AM    RBC 3.71 05/07/2012 06:50 PM    HGB 13.8 03/27/2024 08:58 AM    HCT 40.5 03/27/2024 08:58 AM    MCV 93.8 03/27/2024 08:58 AM    MCH 31.9 03/27/2024 08:58 AM    MCHC 34.1 03/27/2024 08:58 AM    RDW 12.5 03/27/2024 08:58 AM     03/27/2024 08:58 AM     05/07/2012 06:50 PM    MPV 9.6 03/27/2024 08:58 AM     Lab Results   Component Value Date/Time    TSH 0.66 12/23/2021 11:05 AM     Lab Results   Component

## 2024-05-05 DIAGNOSIS — F90.0 ATTENTION DEFICIT HYPERACTIVITY DISORDER (ADHD), PREDOMINANTLY INATTENTIVE TYPE: ICD-10-CM

## 2024-05-06 RX ORDER — DEXTROAMPHETAMINE SACCHARATE, AMPHETAMINE ASPARTATE MONOHYDRATE, DEXTROAMPHETAMINE SULFATE AND AMPHETAMINE SULFATE 5; 5; 5; 5 MG/1; MG/1; MG/1; MG/1
20 CAPSULE, EXTENDED RELEASE ORAL EVERY MORNING
Qty: 30 CAPSULE | Refills: 0 | Status: SHIPPED | OUTPATIENT
Start: 2024-05-06 | End: 2024-06-17 | Stop reason: SDUPTHER

## 2024-05-24 RX ORDER — ESCITALOPRAM OXALATE 20 MG/1
20 TABLET ORAL DAILY
Qty: 90 TABLET | Refills: 3 | Status: SHIPPED | OUTPATIENT
Start: 2024-05-24

## 2024-06-17 DIAGNOSIS — F90.0 ATTENTION DEFICIT HYPERACTIVITY DISORDER (ADHD), PREDOMINANTLY INATTENTIVE TYPE: ICD-10-CM

## 2024-06-17 RX ORDER — DEXTROAMPHETAMINE SACCHARATE, AMPHETAMINE ASPARTATE MONOHYDRATE, DEXTROAMPHETAMINE SULFATE AND AMPHETAMINE SULFATE 5; 5; 5; 5 MG/1; MG/1; MG/1; MG/1
20 CAPSULE, EXTENDED RELEASE ORAL EVERY MORNING
Qty: 30 CAPSULE | Refills: 0 | Status: SHIPPED | OUTPATIENT
Start: 2024-06-17 | End: 2024-07-17

## 2024-06-28 ENCOUNTER — OFFICE VISIT (OUTPATIENT)
Dept: PRIMARY CARE CLINIC | Age: 39
End: 2024-06-28
Payer: COMMERCIAL

## 2024-06-28 VITALS
TEMPERATURE: 97.1 F | OXYGEN SATURATION: 98 % | DIASTOLIC BLOOD PRESSURE: 74 MMHG | HEART RATE: 83 BPM | RESPIRATION RATE: 16 BRPM | BODY MASS INDEX: 23.94 KG/M2 | WEIGHT: 126.8 LBS | HEIGHT: 61 IN | SYSTOLIC BLOOD PRESSURE: 106 MMHG

## 2024-06-28 DIAGNOSIS — G25.81 RESTLESS LEG SYNDROME: Primary | ICD-10-CM

## 2024-06-28 DIAGNOSIS — F32.A ANXIETY AND DEPRESSION: ICD-10-CM

## 2024-06-28 DIAGNOSIS — L98.9 FACIAL LESION: ICD-10-CM

## 2024-06-28 DIAGNOSIS — G47.00 INSOMNIA, UNSPECIFIED TYPE: ICD-10-CM

## 2024-06-28 DIAGNOSIS — F90.9 ATTENTION DEFICIT HYPERACTIVITY DISORDER (ADHD), UNSPECIFIED ADHD TYPE: ICD-10-CM

## 2024-06-28 DIAGNOSIS — F41.9 ANXIETY AND DEPRESSION: ICD-10-CM

## 2024-06-28 PROCEDURE — 99214 OFFICE O/P EST MOD 30 MIN: CPT | Performed by: NURSE PRACTITIONER

## 2024-06-28 PROCEDURE — G8427 DOCREV CUR MEDS BY ELIG CLIN: HCPCS | Performed by: NURSE PRACTITIONER

## 2024-06-28 PROCEDURE — G8420 CALC BMI NORM PARAMETERS: HCPCS | Performed by: NURSE PRACTITIONER

## 2024-06-28 PROCEDURE — 1036F TOBACCO NON-USER: CPT | Performed by: NURSE PRACTITIONER

## 2024-06-28 NOTE — PROGRESS NOTES
Name: Tari Carpio  : 1985         Chief Complaint:     Chief Complaint   Patient presents with    ADHD     Patient states she is doing mostly good. There are some days where she struggles with concentration around 3 or 4.        History of Present Illness:      Tari Carpio is a 38 y.o.  female who presents with ADHD (Patient states she is doing mostly good. There are some days where she struggles with concentration around 3 or 4. )      HPI    ADHD:  Current treatment includes Adderall 20 mg every morning. She states she is doing well overall with this dose. She does notice that around 3-4pm she starts to notice trouble with focus, usually 2-3 days per week. She has a busy schedule and is active into the evenings, such kid's sports activities, housework, work conferences, etc. Denies side effects with the adderall.     RLS:  She admits continued troubles sleeping d/t RLS. She was previous on requip but discontinued this medication due making her symptoms worse. She has tried OTC supplements with no relief. She is also noticing some hot flashes and questions if perimenopause is causing worsening in her RLS. She did notice an improvement with sleep since controlling anxiety with lexapro. She did try gabapentin in  for MSK concern and this caused worsening mental health side effects.     Past Medical History:     Past Medical History:   Diagnosis Date    ADHD (attention deficit hyperactivity disorder) 22    Anxiety     Arthritis     Asthma     allergy induced    Depression     Eczema       Reviewed all health maintenance requirements and ordered appropriate tests  Health Maintenance Due   Topic Date Due    Hepatitis B vaccine (1 of 3 - 3-dose series) 1985    COVID-19 Vaccine ( season) 2023       Past Surgical History:     Past Surgical History:   Procedure Laterality Date    CHOLECYSTECTOMY      ENDOMETRIAL ABLATION      HYSTERECTOMY (CERVIX STATUS UNKNOWN)  2022

## 2024-07-01 RX ORDER — DEXTROAMPHETAMINE SACCHARATE, AMPHETAMINE ASPARTATE, DEXTROAMPHETAMINE SULFATE AND AMPHETAMINE SULFATE 1.25; 1.25; 1.25; 1.25 MG/1; MG/1; MG/1; MG/1
TABLET ORAL
Qty: 30 TABLET | Refills: 0 | Status: SHIPPED | OUTPATIENT
Start: 2024-07-01 | End: 2024-08-01

## 2024-07-11 ENCOUNTER — TELEPHONE (OUTPATIENT)
Dept: PRIMARY CARE CLINIC | Age: 39
End: 2024-07-11

## 2024-07-11 RX ORDER — PRAMIPEXOLE DIHYDROCHLORIDE 0.12 MG/1
0.12 TABLET ORAL NIGHTLY
Qty: 30 TABLET | Refills: 5 | Status: SHIPPED | OUTPATIENT
Start: 2024-07-11

## 2024-07-11 NOTE — TELEPHONE ENCOUNTER
Pt states she can not get into the sleep referral she was given until Oct and she is asking if there is something that can be done in the mean time to help with the symptoms of restless leg, sleeplessness and nocturia bc the supplements OTC of melatonin magnesium and chamomilla and ashwagandha is not helping. She has also tried Unisom as well.

## 2024-07-13 DIAGNOSIS — R21 RASH: ICD-10-CM

## 2024-07-16 RX ORDER — CLOBETASOL PROPIONATE 0.5 MG/G
OINTMENT TOPICAL
Qty: 60 G | Refills: 0 | OUTPATIENT
Start: 2024-07-16

## 2024-07-18 ENCOUNTER — OFFICE VISIT (OUTPATIENT)
Dept: PRIMARY CARE CLINIC | Age: 39
End: 2024-07-18
Payer: COMMERCIAL

## 2024-07-18 VITALS
OXYGEN SATURATION: 97 % | RESPIRATION RATE: 16 BRPM | SYSTOLIC BLOOD PRESSURE: 118 MMHG | BODY MASS INDEX: 23.29 KG/M2 | WEIGHT: 118.6 LBS | HEIGHT: 60 IN | DIASTOLIC BLOOD PRESSURE: 72 MMHG | HEART RATE: 88 BPM | TEMPERATURE: 97.3 F

## 2024-07-18 DIAGNOSIS — R21 RASH: ICD-10-CM

## 2024-07-18 DIAGNOSIS — L24.7 IRRITANT CONTACT DERMATITIS DUE TO PLANTS, EXCEPT FOOD: Primary | ICD-10-CM

## 2024-07-18 PROCEDURE — 99213 OFFICE O/P EST LOW 20 MIN: CPT | Performed by: NURSE PRACTITIONER

## 2024-07-18 PROCEDURE — G8420 CALC BMI NORM PARAMETERS: HCPCS | Performed by: NURSE PRACTITIONER

## 2024-07-18 PROCEDURE — 96372 THER/PROPH/DIAG INJ SC/IM: CPT | Performed by: NURSE PRACTITIONER

## 2024-07-18 PROCEDURE — G8427 DOCREV CUR MEDS BY ELIG CLIN: HCPCS | Performed by: NURSE PRACTITIONER

## 2024-07-18 PROCEDURE — 1036F TOBACCO NON-USER: CPT | Performed by: NURSE PRACTITIONER

## 2024-07-18 RX ORDER — TRIAMCINOLONE ACETONIDE 40 MG/ML
40 INJECTION, SUSPENSION INTRA-ARTICULAR; INTRAMUSCULAR ONCE
Status: COMPLETED | OUTPATIENT
Start: 2024-07-18 | End: 2024-07-18

## 2024-07-18 RX ADMIN — TRIAMCINOLONE ACETONIDE 40 MG: 40 INJECTION, SUSPENSION INTRA-ARTICULAR; INTRAMUSCULAR at 13:08

## 2024-07-18 RX ADMIN — TRIAMCINOLONE ACETONIDE 40 MG: 40 INJECTION, SUSPENSION INTRA-ARTICULAR; INTRAMUSCULAR at 13:09

## 2024-07-18 SDOH — ECONOMIC STABILITY: FOOD INSECURITY: WITHIN THE PAST 12 MONTHS, YOU WORRIED THAT YOUR FOOD WOULD RUN OUT BEFORE YOU GOT MONEY TO BUY MORE.: NEVER TRUE

## 2024-07-18 SDOH — ECONOMIC STABILITY: FOOD INSECURITY: WITHIN THE PAST 12 MONTHS, THE FOOD YOU BOUGHT JUST DIDN'T LAST AND YOU DIDN'T HAVE MONEY TO GET MORE.: NEVER TRUE

## 2024-07-18 SDOH — ECONOMIC STABILITY: INCOME INSECURITY: HOW HARD IS IT FOR YOU TO PAY FOR THE VERY BASICS LIKE FOOD, HOUSING, MEDICAL CARE, AND HEATING?: NOT HARD AT ALL

## 2024-07-18 NOTE — PROGRESS NOTES
UNKNOWN)  03/28/2022    VAGINAL LAPAROSCOPIC ROBOTIC ASSISTED with bilateral salpingectomy     HYSTERECTOMY, VAGINAL N/A 3/28/2022    HYSTERECTOMY VAGINAL LAPAROSCOPIC ROBOTIC ASSISTED with bilateral salpingectomy performed by Diana Moe DO at [facility] OR    KNEE SURGERY Right     LEG SURGERY Left     15 surgical procedures.    TONSILLECTOMY          Medications:       Prior to Admission medications    Medication Sig Start Date End Date Taking? Authorizing Provider   triamcinolone (KENALOG) 0.1 % cream Apply topically 2 times daily to affected area. Do not apply to face or genital area. Do not use for longer than 10 days. 7/16/24  Yes Rena Delgado APRN - CNP   pramipexole (MIRAPEX) 0.125 MG tablet Take 1 tablet by mouth nightly 2-3 hours before bedtime 7/11/24  Yes Rena Delgado APRN - CNP   amphetamine-dextroamphetamine (ADDERALL, 5MG,) 5 MG tablet Take one tablet by mouth in the early afternoon once daily as needed for focus 7/1/24 8/1/24 Yes Rena Delgado APRN - CNP   escitalopram (LEXAPRO) 20 MG tablet Take 1 tablet by mouth once daily 5/24/24  Yes Rena Delgado APRN - CNP   albuterol sulfate HFA (VENTOLIN HFA) 108 (90 Base) MCG/ACT inhaler Inhale 2 puffs into the lungs every 4 hours as needed for Wheezing or Shortness of Breath 1/2/24  Yes Rena Delgado APRN - CNP   montelukast (SINGULAIR) 10 MG tablet Take 1 tablet by mouth nightly 12/20/23  Yes Rena Delgado APRN - CNP   fluticasone (FLONASE) 50 MCG/ACT nasal spray Use 2 spray(s) in each nostril once daily 10/9/23  Yes Rena Delgado APRN - CNP   Multiple Vitamin (MULTIVITAMIN ADULT) TABS Take by mouth   Yes Provider, MD Trent   amphetamine-dextroamphetamine (ADDERALL XR) 20 MG extended release capsule Take 1 capsule by mouth every morning for 30 days. Max Daily Amount: 20 mg 6/17/24 7/17/24  Rena Delgado APRN - CNP        Allergies:       Patient has no known allergies.    Social History:     Tobacco:

## 2024-07-29 DIAGNOSIS — F90.0 ATTENTION DEFICIT HYPERACTIVITY DISORDER (ADHD), PREDOMINANTLY INATTENTIVE TYPE: ICD-10-CM

## 2024-07-29 RX ORDER — DEXTROAMPHETAMINE SACCHARATE, AMPHETAMINE ASPARTATE MONOHYDRATE, DEXTROAMPHETAMINE SULFATE AND AMPHETAMINE SULFATE 5; 5; 5; 5 MG/1; MG/1; MG/1; MG/1
20 CAPSULE, EXTENDED RELEASE ORAL EVERY MORNING
Qty: 30 CAPSULE | Refills: 0 | Status: SHIPPED | OUTPATIENT
Start: 2024-07-29 | End: 2024-08-28

## 2024-08-25 DIAGNOSIS — F90.0 ATTENTION DEFICIT HYPERACTIVITY DISORDER (ADHD), PREDOMINANTLY INATTENTIVE TYPE: ICD-10-CM

## 2024-08-26 RX ORDER — MONTELUKAST SODIUM 10 MG/1
10 TABLET ORAL NIGHTLY
Qty: 90 TABLET | Refills: 3 | Status: SHIPPED | OUTPATIENT
Start: 2024-08-26

## 2024-08-26 RX ORDER — DEXTROAMPHETAMINE SACCHARATE, AMPHETAMINE ASPARTATE MONOHYDRATE, DEXTROAMPHETAMINE SULFATE AND AMPHETAMINE SULFATE 5; 5; 5; 5 MG/1; MG/1; MG/1; MG/1
20 CAPSULE, EXTENDED RELEASE ORAL EVERY MORNING
Qty: 30 CAPSULE | Refills: 0 | Status: SHIPPED | OUTPATIENT
Start: 2024-08-26 | End: 2024-09-25

## 2024-09-03 DIAGNOSIS — F90.9 ATTENTION DEFICIT HYPERACTIVITY DISORDER (ADHD), UNSPECIFIED ADHD TYPE: ICD-10-CM

## 2024-09-03 RX ORDER — DEXTROAMPHETAMINE SACCHARATE, AMPHETAMINE ASPARTATE, DEXTROAMPHETAMINE SULFATE AND AMPHETAMINE SULFATE 1.25; 1.25; 1.25; 1.25 MG/1; MG/1; MG/1; MG/1
TABLET ORAL
Qty: 30 TABLET | Refills: 0 | OUTPATIENT
Start: 2024-09-03 | End: 2024-10-04

## 2024-10-06 DIAGNOSIS — F90.0 ATTENTION DEFICIT HYPERACTIVITY DISORDER (ADHD), PREDOMINANTLY INATTENTIVE TYPE: ICD-10-CM

## 2024-10-07 RX ORDER — DEXTROAMPHETAMINE SACCHARATE, AMPHETAMINE ASPARTATE MONOHYDRATE, DEXTROAMPHETAMINE SULFATE AND AMPHETAMINE SULFATE 5; 5; 5; 5 MG/1; MG/1; MG/1; MG/1
20 CAPSULE, EXTENDED RELEASE ORAL EVERY MORNING
Qty: 30 CAPSULE | Refills: 0 | Status: SHIPPED | OUTPATIENT
Start: 2024-10-07 | End: 2024-11-06

## 2024-10-14 ENCOUNTER — OFFICE VISIT (OUTPATIENT)
Dept: PRIMARY CARE CLINIC | Age: 39
End: 2024-10-14
Payer: COMMERCIAL

## 2024-10-14 VITALS
TEMPERATURE: 96.9 F | OXYGEN SATURATION: 100 % | DIASTOLIC BLOOD PRESSURE: 68 MMHG | SYSTOLIC BLOOD PRESSURE: 100 MMHG | WEIGHT: 121 LBS | BODY MASS INDEX: 23.63 KG/M2 | HEART RATE: 78 BPM

## 2024-10-14 DIAGNOSIS — F90.9 ATTENTION DEFICIT HYPERACTIVITY DISORDER (ADHD), UNSPECIFIED ADHD TYPE: Primary | ICD-10-CM

## 2024-10-14 DIAGNOSIS — G47.00 INSOMNIA, UNSPECIFIED TYPE: ICD-10-CM

## 2024-10-14 DIAGNOSIS — Z00.00 WELLNESS EXAMINATION: ICD-10-CM

## 2024-10-14 PROBLEM — R21 RASH: Status: RESOLVED | Noted: 2023-04-20 | Resolved: 2024-10-14

## 2024-10-14 PROCEDURE — 99213 OFFICE O/P EST LOW 20 MIN: CPT | Performed by: NURSE PRACTITIONER

## 2024-10-14 PROCEDURE — G8428 CUR MEDS NOT DOCUMENT: HCPCS | Performed by: NURSE PRACTITIONER

## 2024-10-14 PROCEDURE — G8420 CALC BMI NORM PARAMETERS: HCPCS | Performed by: NURSE PRACTITIONER

## 2024-10-14 PROCEDURE — 1036F TOBACCO NON-USER: CPT | Performed by: NURSE PRACTITIONER

## 2024-10-14 PROCEDURE — G8484 FLU IMMUNIZE NO ADMIN: HCPCS | Performed by: NURSE PRACTITIONER

## 2024-10-14 RX ORDER — DEXTROAMPHETAMINE SACCHARATE, AMPHETAMINE ASPARTATE, DEXTROAMPHETAMINE SULFATE AND AMPHETAMINE SULFATE 1.25; 1.25; 1.25; 1.25 MG/1; MG/1; MG/1; MG/1
TABLET ORAL
Qty: 30 TABLET | Refills: 0 | Status: SHIPPED | OUTPATIENT
Start: 2024-10-14 | End: 2024-11-14

## 2024-10-14 RX ORDER — DEXTROAMPHETAMINE SACCHARATE, AMPHETAMINE ASPARTATE, DEXTROAMPHETAMINE SULFATE AND AMPHETAMINE SULFATE 1.25; 1.25; 1.25; 1.25 MG/1; MG/1; MG/1; MG/1
TABLET ORAL
Qty: 30 TABLET | Refills: 0 | Status: CANCELLED | OUTPATIENT
Start: 2024-10-14 | End: 2024-11-14

## 2024-10-14 RX ORDER — CLONAZEPAM 0.5 MG/1
TABLET ORAL
COMMUNITY
Start: 2024-10-09

## 2024-10-14 NOTE — PROGRESS NOTES
Name: Tari Carpio  : 1985         Chief Complaint:     Chief Complaint   Patient presents with    3 Month Follow-Up       History of Present Illness:      Tari Carpio is a 38 y.o.  female who presents with 3 Month Follow-Up      HPI    ADHD:  Current treatment includes adderall XR 20mg QAM and adderall immediate release 5mg in the afternoon. She states focus-wise she is doing \"fine\". She has been without 20mg dose due to pharmacy backorder. Denies side effects.     Insomnia:  Following with [facility] [place] sleep medicine. She was started on kloponin 0.5mg QHS but has not started this rx yet. She does have a history of RLS.    Past Medical History:     Past Medical History:   Diagnosis Date    ADHD (attention deficit hyperactivity disorder) 22    Anxiety     Arthritis     Asthma     allergy induced    Depression     Eczema       Reviewed all health maintenance requirements and ordered appropriate tests  Health Maintenance Due   Topic Date Due    Hepatitis B vaccine (1 of 3 - 19+ 3-dose series) 2003    HPV vaccine (3 - 3-dose SCDM series) 2024    Flu vaccine (1) 2024    COVID-19 Vaccine ( season) 2024       Past Surgical History:     Past Surgical History:   Procedure Laterality Date    CHOLECYSTECTOMY      ENDOMETRIAL ABLATION      HYSTERECTOMY (CERVIX STATUS UNKNOWN)  2022    VAGINAL LAPAROSCOPIC ROBOTIC ASSISTED with bilateral salpingectomy     HYSTERECTOMY, VAGINAL N/A 3/28/2022    HYSTERECTOMY VAGINAL LAPAROSCOPIC ROBOTIC ASSISTED with bilateral salpingectomy performed by Diana Moe DO at [facility] OR    KNEE SURGERY Right     LEG SURGERY Left     15 surgical procedures.    TONSILLECTOMY          Medications:       Prior to Admission medications    Medication Sig Start Date End Date Taking? Authorizing Provider   amphetamine-dextroamphetamine (ADDERALL, 5MG,) 5 MG tablet Take one tablet by mouth in the early afternoon once daily as needed

## 2024-11-15 DIAGNOSIS — F90.0 ATTENTION DEFICIT HYPERACTIVITY DISORDER (ADHD), PREDOMINANTLY INATTENTIVE TYPE: ICD-10-CM

## 2024-11-18 RX ORDER — DEXTROAMPHETAMINE SACCHARATE, AMPHETAMINE ASPARTATE MONOHYDRATE, DEXTROAMPHETAMINE SULFATE AND AMPHETAMINE SULFATE 5; 5; 5; 5 MG/1; MG/1; MG/1; MG/1
20 CAPSULE, EXTENDED RELEASE ORAL EVERY MORNING
Qty: 30 CAPSULE | Refills: 0 | Status: SHIPPED | OUTPATIENT
Start: 2024-11-18 | End: 2024-12-18

## 2024-12-29 DIAGNOSIS — F90.0 ATTENTION DEFICIT HYPERACTIVITY DISORDER (ADHD), PREDOMINANTLY INATTENTIVE TYPE: ICD-10-CM

## 2024-12-29 RX ORDER — DEXTROAMPHETAMINE SACCHARATE, AMPHETAMINE ASPARTATE MONOHYDRATE, DEXTROAMPHETAMINE SULFATE AND AMPHETAMINE SULFATE 5; 5; 5; 5 MG/1; MG/1; MG/1; MG/1
20 CAPSULE, EXTENDED RELEASE ORAL EVERY MORNING
Qty: 30 CAPSULE | Refills: 0 | Status: SHIPPED | OUTPATIENT
Start: 2024-12-29 | End: 2025-02-05 | Stop reason: SDUPTHER

## 2025-01-20 ENCOUNTER — HOSPITAL ENCOUNTER (OUTPATIENT)
Age: 40
Discharge: HOME | End: 2025-01-20
Payer: COMMERCIAL

## 2025-01-20 DIAGNOSIS — Z00.00 WELLNESS EXAMINATION: ICD-10-CM

## 2025-01-20 LAB
ALT SERPL-CCNC: 10 U/L (ref 10–35)
ANION GAP SERPL CALCULATED.3IONS-SCNC: 10 MMOL/L (ref 9–16)
AST SERPL-CCNC: 14 U/L (ref 10–35)
BUN SERPL-MCNC: 9 MG/DL (ref 6–20)
BUN/CREAT SERPL: 18 (ref 9–20)
CALCIUM SERPL-MCNC: 9.3 MG/DL (ref 8.6–10.4)
CHLORIDE SERPL-SCNC: 104 MMOL/L (ref 98–107)
CHOLEST SERPL-MCNC: 180 MG/DL (ref 0–199)
CHOLESTEROL/HDL RATIO: 3
CO2 SERPL-SCNC: 25 MMOL/L (ref 20–31)
CREAT SERPL-MCNC: 0.5 MG/DL (ref 0.5–0.9)
ERYTHROCYTE [DISTWIDTH] IN BLOOD BY AUTOMATED COUNT: 12.6 % (ref 11.8–14.4)
EST. AVERAGE GLUCOSE BLD GHB EST-MCNC: 77 MG/DL
GFR, ESTIMATED: >90 ML/MIN/1.73M2
GLUCOSE SERPL-MCNC: 86 MG/DL (ref 74–99)
HBA1C MFR BLD: 4.3 % (ref 4–6)
HCT VFR BLD AUTO: 42.6 % (ref 36.3–47.1)
HDLC SERPL-MCNC: 61 MG/DL
HGB BLD-MCNC: 14.6 G/DL (ref 11.9–15.1)
LDLC SERPL CALC-MCNC: 98 MG/DL (ref 0–100)
MCH RBC QN AUTO: 32.2 PG (ref 25.2–33.5)
MCHC RBC AUTO-ENTMCNC: 34.3 G/DL (ref 28.4–34.8)
MCV RBC AUTO: 93.8 FL (ref 82.6–102.9)
NRBC BLD-RTO: 0 PER 100 WBC
PLATELET # BLD AUTO: 322 K/UL (ref 138–453)
PMV BLD AUTO: 9.6 FL (ref 8.1–13.5)
POTASSIUM SERPL-SCNC: 4.3 MMOL/L (ref 3.7–5.3)
RBC # BLD AUTO: 4.54 M/UL (ref 3.95–5.11)
SODIUM SERPL-SCNC: 139 MMOL/L (ref 136–145)
TRIGL SERPL-MCNC: 105 MG/DL
TSH SERPL DL<=0.05 MIU/L-ACNC: 0.56 UIU/ML (ref 0.27–4.2)
VLDLC SERPL CALC-MCNC: 21 MG/DL (ref 1–30)
WBC OTHER # BLD: 6.3 K/UL (ref 3.5–11.3)

## 2025-01-20 PROCEDURE — 80048 BASIC METABOLIC PNL TOTAL CA: CPT

## 2025-01-20 PROCEDURE — 80061 LIPID PANEL: CPT

## 2025-01-20 PROCEDURE — 84460 ALANINE AMINO (ALT) (SGPT): CPT

## 2025-01-20 PROCEDURE — 83036 HEMOGLOBIN GLYCOSYLATED A1C: CPT

## 2025-01-20 PROCEDURE — 84443 ASSAY THYROID STIM HORMONE: CPT

## 2025-01-20 PROCEDURE — 84450 TRANSFERASE (AST) (SGOT): CPT

## 2025-01-20 PROCEDURE — 85027 COMPLETE CBC AUTOMATED: CPT

## 2025-01-20 PROCEDURE — 36415 COLL VENOUS BLD VENIPUNCTURE: CPT

## 2025-01-21 ENCOUNTER — OFFICE VISIT (OUTPATIENT)
Dept: PRIMARY CARE CLINIC | Age: 40
End: 2025-01-21

## 2025-01-21 VITALS
BODY MASS INDEX: 24.22 KG/M2 | DIASTOLIC BLOOD PRESSURE: 60 MMHG | TEMPERATURE: 95.9 F | OXYGEN SATURATION: 99 % | WEIGHT: 124 LBS | HEART RATE: 90 BPM | SYSTOLIC BLOOD PRESSURE: 90 MMHG

## 2025-01-21 DIAGNOSIS — Z23 NEED FOR VACCINATION: ICD-10-CM

## 2025-01-21 DIAGNOSIS — Z00.00 WELLNESS EXAMINATION: Primary | ICD-10-CM

## 2025-01-21 SDOH — ECONOMIC STABILITY: FOOD INSECURITY: WITHIN THE PAST 12 MONTHS, THE FOOD YOU BOUGHT JUST DIDN'T LAST AND YOU DIDN'T HAVE MONEY TO GET MORE.: NEVER TRUE

## 2025-01-21 SDOH — ECONOMIC STABILITY: FOOD INSECURITY: WITHIN THE PAST 12 MONTHS, YOU WORRIED THAT YOUR FOOD WOULD RUN OUT BEFORE YOU GOT MONEY TO BUY MORE.: NEVER TRUE

## 2025-01-21 ASSESSMENT — PATIENT HEALTH QUESTIONNAIRE - PHQ9
2. FEELING DOWN, DEPRESSED OR HOPELESS: NOT AT ALL
3. TROUBLE FALLING OR STAYING ASLEEP: MORE THAN HALF THE DAYS
1. LITTLE INTEREST OR PLEASURE IN DOING THINGS: SEVERAL DAYS
7. TROUBLE CONCENTRATING ON THINGS, SUCH AS READING THE NEWSPAPER OR WATCHING TELEVISION: SEVERAL DAYS
6. FEELING BAD ABOUT YOURSELF - OR THAT YOU ARE A FAILURE OR HAVE LET YOURSELF OR YOUR FAMILY DOWN: NOT AT ALL
SUM OF ALL RESPONSES TO PHQ9 QUESTIONS 1 & 2: 1
3. TROUBLE FALLING OR STAYING ASLEEP: MORE THAN HALF THE DAYS
SUM OF ALL RESPONSES TO PHQ QUESTIONS 1-9: 6
SUM OF ALL RESPONSES TO PHQ QUESTIONS 1-9: 6
5. POOR APPETITE OR OVEREATING: NOT AT ALL
10. IF YOU CHECKED OFF ANY PROBLEMS, HOW DIFFICULT HAVE THESE PROBLEMS MADE IT FOR YOU TO DO YOUR WORK, TAKE CARE OF THINGS AT HOME, OR GET ALONG WITH OTHER PEOPLE: NOT DIFFICULT AT ALL
9. THOUGHTS THAT YOU WOULD BE BETTER OFF DEAD, OR OF HURTING YOURSELF: NOT AT ALL
SUM OF ALL RESPONSES TO PHQ QUESTIONS 1-9: 6
10. IF YOU CHECKED OFF ANY PROBLEMS, HOW DIFFICULT HAVE THESE PROBLEMS MADE IT FOR YOU TO DO YOUR WORK, TAKE CARE OF THINGS AT HOME, OR GET ALONG WITH OTHER PEOPLE: NOT DIFFICULT AT ALL
SUM OF ALL RESPONSES TO PHQ QUESTIONS 1-9: 6
8. MOVING OR SPEAKING SO SLOWLY THAT OTHER PEOPLE COULD HAVE NOTICED. OR THE OPPOSITE, BEING SO FIGETY OR RESTLESS THAT YOU HAVE BEEN MOVING AROUND A LOT MORE THAN USUAL: NOT AT ALL
1. LITTLE INTEREST OR PLEASURE IN DOING THINGS: SEVERAL DAYS
5. POOR APPETITE OR OVEREATING: NOT AT ALL
6. FEELING BAD ABOUT YOURSELF - OR THAT YOU ARE A FAILURE OR HAVE LET YOURSELF OR YOUR FAMILY DOWN: NOT AT ALL
7. TROUBLE CONCENTRATING ON THINGS, SUCH AS READING THE NEWSPAPER OR WATCHING TELEVISION: SEVERAL DAYS
4. FEELING TIRED OR HAVING LITTLE ENERGY: MORE THAN HALF THE DAYS
9. THOUGHTS THAT YOU WOULD BE BETTER OFF DEAD, OR OF HURTING YOURSELF: NOT AT ALL
2. FEELING DOWN, DEPRESSED OR HOPELESS: NOT AT ALL
SUM OF ALL RESPONSES TO PHQ QUESTIONS 1-9: 6
8. MOVING OR SPEAKING SO SLOWLY THAT OTHER PEOPLE COULD HAVE NOTICED. OR THE OPPOSITE - BEING SO FIDGETY OR RESTLESS THAT YOU HAVE BEEN MOVING AROUND A LOT MORE THAN USUAL: NOT AT ALL
4. FEELING TIRED OR HAVING LITTLE ENERGY: MORE THAN HALF THE DAYS

## 2025-01-21 ASSESSMENT — ENCOUNTER SYMPTOMS
SHORTNESS OF BREATH: 0
CONSTIPATION: 0
DIARRHEA: 0

## 2025-01-21 NOTE — PATIENT INSTRUCTIONS
after the vaccinated person leaves the clinic. If you see signs of a severe allergic reaction (hives, swelling of the face and throat, difficulty breathing, a fast heartbeat, dizziness, or weakness), call 9-1-1 and get the person to the nearest hospital.    For other signs that concern you, call your health care provider.    Adverse reactions should be reported to the Vaccine Adverse Event Reporting System (VAERS). Your health care provider will usually file this report, or you can do it yourself. Visit the VAERS website at www.vaers.[facility].gov or call 1-767.573.9781. VAERS is only for reporting reactions, and VAERS staff members do not give medical advice.    6. The National Vaccine Injury Compensation Program    The National Vaccine Injury Compensation Program (VICP) is a federal program that was created to compensate people who may have been injured by certain vaccines. Claims regarding alleged injury or death due to vaccination have a time limit for filing, which may be as short as two years. Visit the VICP website at www.[facility].gov/vaccinecompensation or call 1-527.910.7087 to learn about the program and about filing a claim.     7. How can I learn more?    o Ask your health care provider.   o Call your local or state health department.   o Visit the website of the Food and Drug Administration (FDA) for vaccine package inserts and additional information at www.fda.gov/vaccines-blood-biologics/vaccines.  o Contact the Centers for Disease Control and Prevention (CDC):  - Call 1-118.149.4788 (0-525-NUB-INFO) or  - Visit CDC's influenza website at www.cdc.gov/flu.    Vaccine Information Statement   Inactivated Influenza Vaccine   8/6/2021  42 U.S.C. § 300aa-26     Department of Health and Human Services  Centers for Disease Control and Prevention

## 2025-01-21 NOTE — PROGRESS NOTES
Name: Tari Carpio  : 1985         Chief Complaint:     Chief Complaint   Patient presents with    Annual Exam    ADHD     -denies any issues  -doing good with the medication       History of Present Illness:      Tari Carpio is a 39 y.o.  female who presents with Annual Exam and ADHD (-denies any issues/-doing good with the medication)      HPI    Wellness:  She admits well balanced diet. For exercise she notes \"not as much as she should\". She admits routine eye exams. She admits routine dental exams. She is vaccinated for covid, most recent vaccine . She is UTD tetanus vaccine. She is not UTD influenza vaccine. She admits fam hx of colorectal cancer (grandfather, unsure paternal or maternal, estimated age diagnosed 40-50s). Most recent mammogram 2024 benign. Scheduled for repeat mammogram 2025. She admits fam hx of breast cancer (older sister and maternal aunt). S/p hysterectomy with cervix removal . Following with Dr. John for OBGYN care. Smoking status: previous, quit \"years ago\".     ADHD:  Current treatment includes adderall XR 20mg QAM and adderall 5mg in the afternoon. Working well overall.     Past Medical History:     Past Medical History:   Diagnosis Date    ADHD (attention deficit hyperactivity disorder) 22    Anxiety     Arthritis     Asthma     allergy induced    Depression     Eczema       Reviewed all health maintenance requirements and ordered appropriate tests  Health Maintenance Due   Topic Date Due    Hepatitis B vaccine (1 of 3 - 19+ 3-dose series) 2003    HPV vaccine (3 - 3-dose SCDM series) 2024    Flu vaccine (1) 2024    COVID-19 Vaccine ( -  season) 2024       Past Surgical History:     Past Surgical History:   Procedure Laterality Date    CHOLECYSTECTOMY      ENDOMETRIAL ABLATION      HYSTERECTOMY (CERVIX STATUS UNKNOWN)  2022    VAGINAL LAPAROSCOPIC ROBOTIC ASSISTED with bilateral salpingectomy     HYSTERECTOMY, VAGINAL N/A

## 2025-02-05 DIAGNOSIS — F90.0 ATTENTION DEFICIT HYPERACTIVITY DISORDER (ADHD), PREDOMINANTLY INATTENTIVE TYPE: ICD-10-CM

## 2025-02-06 RX ORDER — DEXTROAMPHETAMINE SACCHARATE, AMPHETAMINE ASPARTATE MONOHYDRATE, DEXTROAMPHETAMINE SULFATE AND AMPHETAMINE SULFATE 5; 5; 5; 5 MG/1; MG/1; MG/1; MG/1
20 CAPSULE, EXTENDED RELEASE ORAL EVERY MORNING
Qty: 30 CAPSULE | Refills: 0 | Status: SHIPPED | OUTPATIENT
Start: 2025-02-06 | End: 2025-03-08

## 2025-02-11 ENCOUNTER — HOSPITAL ENCOUNTER (OUTPATIENT)
Dept: WOMENS IMAGING | Age: 40
Discharge: HOME | End: 2025-02-13
Payer: COMMERCIAL

## 2025-02-11 ENCOUNTER — ANCILLARY ORDERS (OUTPATIENT)
Dept: WOMENS IMAGING | Age: 40
End: 2025-02-11

## 2025-02-11 VITALS — HEIGHT: 61 IN | BODY MASS INDEX: 22.47 KG/M2 | WEIGHT: 119 LBS

## 2025-02-11 DIAGNOSIS — Z12.31 BREAST CANCER SCREENING BY MAMMOGRAM: ICD-10-CM

## 2025-02-11 DIAGNOSIS — Z12.31 BREAST CANCER SCREENING BY MAMMOGRAM: Primary | ICD-10-CM

## 2025-02-11 DIAGNOSIS — R92.8 ABNORMAL MAMMOGRAM OF RIGHT BREAST: ICD-10-CM

## 2025-02-11 PROCEDURE — 77067 SCR MAMMO BI INCL CAD: CPT

## 2025-02-17 ENCOUNTER — HOSPITAL ENCOUNTER (OUTPATIENT)
Dept: ULTRASOUND IMAGING | Age: 40
Discharge: HOME | End: 2025-02-19
Payer: COMMERCIAL

## 2025-02-17 DIAGNOSIS — R92.8 ABNORMAL MAMMOGRAM OF RIGHT BREAST: ICD-10-CM

## 2025-02-17 PROCEDURE — 76642 ULTRASOUND BREAST LIMITED: CPT

## 2025-03-10 DIAGNOSIS — F90.0 ATTENTION DEFICIT HYPERACTIVITY DISORDER (ADHD), PREDOMINANTLY INATTENTIVE TYPE: ICD-10-CM

## 2025-03-10 RX ORDER — DEXTROAMPHETAMINE SACCHARATE, AMPHETAMINE ASPARTATE MONOHYDRATE, DEXTROAMPHETAMINE SULFATE AND AMPHETAMINE SULFATE 5; 5; 5; 5 MG/1; MG/1; MG/1; MG/1
20 CAPSULE, EXTENDED RELEASE ORAL EVERY MORNING
Qty: 30 CAPSULE | Refills: 0 | Status: SHIPPED | OUTPATIENT
Start: 2025-03-10 | End: 2025-04-09

## 2025-03-31 DIAGNOSIS — F90.9 ATTENTION DEFICIT HYPERACTIVITY DISORDER (ADHD), UNSPECIFIED ADHD TYPE: ICD-10-CM

## 2025-04-01 RX ORDER — DEXTROAMPHETAMINE SACCHARATE, AMPHETAMINE ASPARTATE, DEXTROAMPHETAMINE SULFATE AND AMPHETAMINE SULFATE 1.25; 1.25; 1.25; 1.25 MG/1; MG/1; MG/1; MG/1
TABLET ORAL
Qty: 30 TABLET | Refills: 0 | Status: SHIPPED | OUTPATIENT
Start: 2025-04-01 | End: 2025-05-01

## 2025-04-01 RX ORDER — MONTELUKAST SODIUM 10 MG/1
10 TABLET ORAL NIGHTLY
Qty: 90 TABLET | Refills: 0 | Status: SHIPPED | OUTPATIENT
Start: 2025-04-01

## 2025-04-08 DIAGNOSIS — F90.0 ATTENTION DEFICIT HYPERACTIVITY DISORDER (ADHD), PREDOMINANTLY INATTENTIVE TYPE: ICD-10-CM

## 2025-04-08 RX ORDER — DEXTROAMPHETAMINE SACCHARATE, AMPHETAMINE ASPARTATE MONOHYDRATE, DEXTROAMPHETAMINE SULFATE AND AMPHETAMINE SULFATE 5; 5; 5; 5 MG/1; MG/1; MG/1; MG/1
20 CAPSULE, EXTENDED RELEASE ORAL EVERY MORNING
Qty: 30 CAPSULE | Refills: 0 | Status: SHIPPED | OUTPATIENT
Start: 2025-04-08 | End: 2025-05-08

## 2025-04-22 ENCOUNTER — OFFICE VISIT (OUTPATIENT)
Dept: PRIMARY CARE CLINIC | Age: 40
End: 2025-04-22
Payer: COMMERCIAL

## 2025-04-22 VITALS
WEIGHT: 133 LBS | DIASTOLIC BLOOD PRESSURE: 70 MMHG | TEMPERATURE: 96.9 F | SYSTOLIC BLOOD PRESSURE: 120 MMHG | OXYGEN SATURATION: 98 % | BODY MASS INDEX: 25.13 KG/M2 | HEART RATE: 76 BPM

## 2025-04-22 DIAGNOSIS — J30.2 SEASONAL ALLERGIC RHINITIS, UNSPECIFIED TRIGGER: ICD-10-CM

## 2025-04-22 DIAGNOSIS — G25.81 RLS (RESTLESS LEGS SYNDROME): ICD-10-CM

## 2025-04-22 DIAGNOSIS — F90.2 ATTENTION DEFICIT HYPERACTIVITY DISORDER (ADHD), COMBINED TYPE: Primary | ICD-10-CM

## 2025-04-22 DIAGNOSIS — F32.A ANXIETY AND DEPRESSION: ICD-10-CM

## 2025-04-22 DIAGNOSIS — F41.9 ANXIETY AND DEPRESSION: ICD-10-CM

## 2025-04-22 PROCEDURE — G2211 COMPLEX E/M VISIT ADD ON: HCPCS | Performed by: NURSE PRACTITIONER

## 2025-04-22 PROCEDURE — 1036F TOBACCO NON-USER: CPT | Performed by: NURSE PRACTITIONER

## 2025-04-22 PROCEDURE — 99214 OFFICE O/P EST MOD 30 MIN: CPT | Performed by: NURSE PRACTITIONER

## 2025-04-22 PROCEDURE — G8419 CALC BMI OUT NRM PARAM NOF/U: HCPCS | Performed by: NURSE PRACTITIONER

## 2025-04-22 PROCEDURE — G8427 DOCREV CUR MEDS BY ELIG CLIN: HCPCS | Performed by: NURSE PRACTITIONER

## 2025-04-22 RX ORDER — HYDROXYZINE HYDROCHLORIDE 10 MG/1
10 TABLET, FILM COATED ORAL 3 TIMES DAILY PRN
Qty: 30 TABLET | Refills: 1 | Status: SHIPPED | OUTPATIENT
Start: 2025-04-22 | End: 2025-05-12

## 2025-04-22 NOTE — PROGRESS NOTES
Name: Tari Carpio  : 1985         Chief Complaint:     Chief Complaint   Patient presents with    ADHD     -today, she states she is doing well on the medication.       History of Present Illness:      Tari Carpio is a 39 y.o.  female who presents with ADHD (-today, she states she is doing well on the medication.)      HPI    ADHD:  Current treatment includes adderall XR 20mg QAM and adderall 5mg in the afternoon as needed. She is taking PM dose of adderall when needed for busy evenings, on average 2-3 times per week. Medications working well overall.     Anxiety, Depression:  Current treatment includes lexapro 20mg QD. She states her anxiety has recently been heightened. She has had some episodes of heart racing and tingling fingers that she describes as panic attacks. She has been in counseling in the past but is not currently enrolled.     RLS:  Following with sleep medicine, Dr. Santiago. Manages klonopin 0.5mg QHS PRN.     Allergic Rhinitis:  Current treatment includes flonase and singulair 10mg QD. Working well to control symptoms.     Past Medical History:     Past Medical History:   Diagnosis Date    ADHD (attention deficit hyperactivity disorder) 22    Anxiety     Arthritis     Asthma     allergy induced    Depression     Eczema       Reviewed all health maintenance requirements and ordered appropriate tests  Health Maintenance Due   Topic Date Due    Hepatitis B vaccine (1 of 3 - 19+ 3-dose series) 2003    HPV vaccine (3 - 3-dose SCDM series) 2024    COVID-19 Vaccine (2024- season) 2024       Past Surgical History:     Past Surgical History:   Procedure Laterality Date    CHOLECYSTECTOMY      ENDOMETRIAL ABLATION      HYSTERECTOMY (CERVIX STATUS UNKNOWN)  2022    VAGINAL LAPAROSCOPIC ROBOTIC ASSISTED with bilateral salpingectomy     HYSTERECTOMY, VAGINAL N/A 3/28/2022    HYSTERECTOMY VAGINAL LAPAROSCOPIC ROBOTIC ASSISTED with bilateral salpingectomy performed

## 2025-05-13 DIAGNOSIS — F90.0 ATTENTION DEFICIT HYPERACTIVITY DISORDER (ADHD), PREDOMINANTLY INATTENTIVE TYPE: ICD-10-CM

## 2025-05-13 RX ORDER — DEXTROAMPHETAMINE SACCHARATE, AMPHETAMINE ASPARTATE MONOHYDRATE, DEXTROAMPHETAMINE SULFATE AND AMPHETAMINE SULFATE 5; 5; 5; 5 MG/1; MG/1; MG/1; MG/1
20 CAPSULE, EXTENDED RELEASE ORAL EVERY MORNING
Qty: 30 CAPSULE | Refills: 0 | Status: SHIPPED | OUTPATIENT
Start: 2025-05-13 | End: 2025-06-12

## 2025-06-05 RX ORDER — ESCITALOPRAM OXALATE 20 MG/1
20 TABLET ORAL DAILY
Qty: 90 TABLET | Refills: 3 | Status: SHIPPED | OUTPATIENT
Start: 2025-06-05

## 2025-06-21 DIAGNOSIS — F90.0 ATTENTION DEFICIT HYPERACTIVITY DISORDER (ADHD), PREDOMINANTLY INATTENTIVE TYPE: ICD-10-CM

## 2025-06-23 RX ORDER — DEXTROAMPHETAMINE SACCHARATE, AMPHETAMINE ASPARTATE MONOHYDRATE, DEXTROAMPHETAMINE SULFATE AND AMPHETAMINE SULFATE 5; 5; 5; 5 MG/1; MG/1; MG/1; MG/1
20 CAPSULE, EXTENDED RELEASE ORAL EVERY MORNING
Qty: 30 CAPSULE | Refills: 0 | Status: SHIPPED | OUTPATIENT
Start: 2025-06-23 | End: 2025-07-23

## 2025-07-18 DIAGNOSIS — F90.9 ATTENTION DEFICIT HYPERACTIVITY DISORDER (ADHD), UNSPECIFIED ADHD TYPE: ICD-10-CM

## 2025-07-21 RX ORDER — DEXTROAMPHETAMINE SACCHARATE, AMPHETAMINE ASPARTATE, DEXTROAMPHETAMINE SULFATE AND AMPHETAMINE SULFATE 1.25; 1.25; 1.25; 1.25 MG/1; MG/1; MG/1; MG/1
TABLET ORAL
Qty: 30 TABLET | Refills: 0 | Status: SHIPPED | OUTPATIENT
Start: 2025-07-21 | End: 2025-08-17

## 2025-07-29 DIAGNOSIS — F90.0 ATTENTION DEFICIT HYPERACTIVITY DISORDER (ADHD), PREDOMINANTLY INATTENTIVE TYPE: ICD-10-CM

## 2025-07-29 RX ORDER — DEXTROAMPHETAMINE SACCHARATE, AMPHETAMINE ASPARTATE MONOHYDRATE, DEXTROAMPHETAMINE SULFATE AND AMPHETAMINE SULFATE 5; 5; 5; 5 MG/1; MG/1; MG/1; MG/1
20 CAPSULE, EXTENDED RELEASE ORAL EVERY MORNING
Qty: 30 CAPSULE | Refills: 0 | Status: SHIPPED | OUTPATIENT
Start: 2025-07-29 | End: 2025-08-28

## 2025-08-07 ENCOUNTER — OFFICE VISIT (OUTPATIENT)
Dept: PRIMARY CARE CLINIC | Age: 40
End: 2025-08-07
Payer: COMMERCIAL

## 2025-08-07 VITALS
DIASTOLIC BLOOD PRESSURE: 70 MMHG | HEART RATE: 93 BPM | SYSTOLIC BLOOD PRESSURE: 110 MMHG | OXYGEN SATURATION: 99 % | TEMPERATURE: 96.8 F | BODY MASS INDEX: 24.37 KG/M2 | WEIGHT: 129 LBS

## 2025-08-07 DIAGNOSIS — F41.9 ANXIETY AND DEPRESSION: ICD-10-CM

## 2025-08-07 DIAGNOSIS — F32.A ANXIETY AND DEPRESSION: ICD-10-CM

## 2025-08-07 DIAGNOSIS — F90.0 ATTENTION DEFICIT HYPERACTIVITY DISORDER (ADHD), PREDOMINANTLY INATTENTIVE TYPE: Primary | ICD-10-CM

## 2025-08-07 PROCEDURE — 1036F TOBACCO NON-USER: CPT | Performed by: NURSE PRACTITIONER

## 2025-08-07 PROCEDURE — 99214 OFFICE O/P EST MOD 30 MIN: CPT | Performed by: NURSE PRACTITIONER

## 2025-08-07 PROCEDURE — G8427 DOCREV CUR MEDS BY ELIG CLIN: HCPCS | Performed by: NURSE PRACTITIONER

## 2025-08-07 PROCEDURE — G2211 COMPLEX E/M VISIT ADD ON: HCPCS | Performed by: NURSE PRACTITIONER

## 2025-08-07 PROCEDURE — G8420 CALC BMI NORM PARAMETERS: HCPCS | Performed by: NURSE PRACTITIONER

## 2025-08-07 RX ORDER — HYDROXYZINE HYDROCHLORIDE 10 MG/1
TABLET, FILM COATED ORAL
COMMUNITY
Start: 2025-07-16

## 2025-08-14 RX ORDER — HYDROXYZINE HYDROCHLORIDE 10 MG/1
10 TABLET, FILM COATED ORAL 3 TIMES DAILY PRN
Qty: 30 TABLET | Refills: 3 | Status: SHIPPED | OUTPATIENT
Start: 2025-08-14

## 2025-09-02 RX ORDER — MONTELUKAST SODIUM 10 MG/1
10 TABLET ORAL NIGHTLY
Qty: 90 TABLET | Refills: 1 | Status: SHIPPED | OUTPATIENT
Start: 2025-09-02

## (undated) DEVICE — UNDERPANTS INCONT XL 45-70IN KNIT SEAMLESS DSGN COLOR-CODED

## (undated) DEVICE — ELECTRODE ES AD CRD L15FT DISP FOR PT BELOW 30LB REM

## (undated) DEVICE — ARM DRAPE

## (undated) DEVICE — APPLICATOR MEDICATED 26 CC SOLUTION HI LT ORNG CHLORAPREP

## (undated) DEVICE — DRESSING SURESITE-123PLUSPAD 2.4 IN X2.8 IN

## (undated) DEVICE — WARMER SCP 2 ANTIFOG LAP DISP

## (undated) DEVICE — SUTURE DEV SZ 2-0 WND CLSR ABSRB GS-22 VLOC COVIDIEN VLOCM2145

## (undated) DEVICE — SUTURE MCRYL SZ 4-0 L27IN ABSRB UD L19MM PS-2 1/2 CIR PRIM Y426H

## (undated) DEVICE — SYRINGE MED 10ML LUERLOCK TIP W/O SFTY DISP

## (undated) DEVICE — BLADELESS OBTURATOR: Brand: WECK VISTA

## (undated) DEVICE — [HIGH FLOW INSUFFLATOR,  DO NOT USE IF PACKAGE IS DAMAGED,  KEEP DRY,  KEEP AWAY FROM SUNLIGHT,  PROTECT FROM HEAT AND RADIOACTIVE SOURCES.]: Brand: PNEUMOSURE

## (undated) DEVICE — TOTAL TRAY, DB, 100% SILI FOLEY, 16FR 10: Brand: MEDLINE

## (undated) DEVICE — LAVH-LF: Brand: MEDLINE INDUSTRIES, INC.

## (undated) DEVICE — Device

## (undated) DEVICE — SOLUTION SURG PREP ANTIMICROBIAL 4 OZ SKIN WND EXIDINE

## (undated) DEVICE — 40586 ADVANCED TRENDELENBURG POSITIONING KIT: Brand: 40586 ADVANCED TRENDELENBURG POSITIONING KIT

## (undated) DEVICE — DRAPE,UTILTY,TAPE,15X26, 4EA/PK: Brand: MEDLINE

## (undated) DEVICE — SOLUTION IV IRRIG POUR BRL 0.9% SODIUM CHL 2F7124

## (undated) DEVICE — INSUFFLATION NEEDLE TO ESTABLISH PNEUMOPERITONEUM.: Brand: INSUFFLATION NEEDLE

## (undated) DEVICE — COVER,MAYO STAND,STERILE: Brand: MEDLINE

## (undated) DEVICE — MASTISOL ADHESIVE LIQ 2/3ML

## (undated) DEVICE — Z DISCONTINUED APPLICATOR SURG PREP 0.35OZ 2% CHG 70% ISO ALC W/ HI LT

## (undated) DEVICE — SPONGE LAP W18XL18IN WHT COT 4 PLY FLD STRUNG RADPQ DISP ST

## (undated) DEVICE — GOWN,SIRUS,POLYRNF,BRTHSLV,XL,30/CS: Brand: MEDLINE

## (undated) DEVICE — ELECTRO LUBE IS A SINGLE PATIENT USE DEVICE THAT IS INTENDED TO BE USED ON ELECTROSURGICAL ELECTRODES TO REDUCE STICKING.: Brand: KEY SURGICAL ELECTRO LUBE

## (undated) DEVICE — Z DISCONTINUED BY MEDLINE USE 2711682 TRAY SKIN PREP DRY W/ PREM GLV

## (undated) DEVICE — STRIP,CLOSURE,WOUND,MEDI-STRIP,1/2X4: Brand: MEDLINE

## (undated) DEVICE — 20 ML SYRINGE LUER-LOCK TIP: Brand: MONOJECT

## (undated) DEVICE — CANNULA SEAL

## (undated) DEVICE — TIP COVER ACCESSORY

## (undated) DEVICE — GLOVE SURG SZ 65 L12IN FNGR THK87MIL WHT LTX FREE

## (undated) DEVICE — COVER LT HNDL BLU PLAS

## (undated) DEVICE — Device: Brand: UTERINE ELEVATOR PRO

## (undated) DEVICE — MATERNITY KNIT PANTS,SEAMLESS: Brand: WINGS